# Patient Record
Sex: MALE | Race: WHITE | NOT HISPANIC OR LATINO | Employment: FULL TIME | URBAN - METROPOLITAN AREA
[De-identification: names, ages, dates, MRNs, and addresses within clinical notes are randomized per-mention and may not be internally consistent; named-entity substitution may affect disease eponyms.]

---

## 2017-03-28 ENCOUNTER — OFFICE VISIT (OUTPATIENT)
Dept: FAMILY MEDICINE CLINIC | Facility: CLINIC | Age: 37
End: 2017-03-28

## 2017-03-28 VITALS
TEMPERATURE: 98.2 F | DIASTOLIC BLOOD PRESSURE: 96 MMHG | OXYGEN SATURATION: 99 % | RESPIRATION RATE: 18 BRPM | HEART RATE: 81 BPM | WEIGHT: 244.2 LBS | SYSTOLIC BLOOD PRESSURE: 132 MMHG | BODY MASS INDEX: 31.35 KG/M2

## 2017-03-28 DIAGNOSIS — J18.9 PNEUMONIA OF BOTH LUNGS DUE TO INFECTIOUS ORGANISM, UNSPECIFIED PART OF LUNG: Primary | ICD-10-CM

## 2017-03-28 PROCEDURE — 99214 OFFICE O/P EST MOD 30 MIN: CPT | Performed by: NURSE PRACTITIONER

## 2017-03-28 RX ORDER — AZITHROMYCIN 250 MG/1
TABLET, FILM COATED ORAL
Qty: 6 TABLET | Refills: 0 | Status: SHIPPED | OUTPATIENT
Start: 2017-03-28 | End: 2017-03-31

## 2017-03-28 RX ORDER — ALBUTEROL SULFATE 90 UG/1
2 AEROSOL, METERED RESPIRATORY (INHALATION) EVERY 4 HOURS PRN
Qty: 1 INHALER | Refills: 0 | Status: SHIPPED | OUTPATIENT
Start: 2017-03-28 | End: 2021-01-04

## 2017-03-28 RX ORDER — BENZONATATE 100 MG/1
100-200 CAPSULE ORAL 3 TIMES DAILY PRN
Qty: 120 CAPSULE | Refills: 0 | Status: SHIPPED | OUTPATIENT
Start: 2017-03-28 | End: 2021-01-04

## 2017-03-28 RX ORDER — METHYLPREDNISOLONE 4 MG/1
TABLET ORAL
Qty: 21 TABLET | Refills: 0 | Status: SHIPPED | OUTPATIENT
Start: 2017-03-28 | End: 2019-11-20

## 2017-03-28 NOTE — PROGRESS NOTES
Subjective   Farhan Kaye is a 36 y.o. male.     History of Present Illness   Productive cough with brown sputum, wheezing and tightness in chest, feels rattle in chest at times, fatigue and night sweats and chest congestion for the past 2 weeks  + hx of double pneumonia 3 years ago  Taking OTC meds with no improvement     The following portions of the patient's history were reviewed and updated as appropriate: allergies, current medications, past family history, past medical history, past social history, past surgical history and problem list.    Review of Systems   Constitutional: Positive for fatigue and fever.   HENT: Positive for congestion. Negative for sore throat.    Eyes: Negative.    Respiratory: Positive for cough, chest tightness, shortness of breath and wheezing.    Cardiovascular: Negative.    Gastrointestinal: Negative.    Endocrine: Negative.    Genitourinary: Negative.    Musculoskeletal: Negative.    Skin: Negative.    Allergic/Immunologic: Negative.    Neurological: Negative.  Negative for light-headedness.   Hematological: Negative.    Psychiatric/Behavioral: Negative.        Objective   Physical Exam   Constitutional: He is oriented to person, place, and time. Vital signs are normal. He appears well-developed and well-nourished. No distress.   HENT:   Head: Normocephalic.   Right Ear: Tympanic membrane, external ear and ear canal normal.   Left Ear: Tympanic membrane, external ear and ear canal normal.   Nose: Nose normal. No mucosal edema or rhinorrhea. Right sinus exhibits no maxillary sinus tenderness and no frontal sinus tenderness. Left sinus exhibits no maxillary sinus tenderness and no frontal sinus tenderness.   Mouth/Throat: Uvula is midline, oropharynx is clear and moist and mucous membranes are normal.   Eyes: Conjunctivae and lids are normal. Pupils are equal, round, and reactive to light.   Neck: Trachea normal and normal range of motion. Neck supple. Carotid bruit is not present.  No thyroid mass and no thyromegaly present.   Cardiovascular: Normal rate, regular rhythm, S1 normal, S2 normal, normal heart sounds and intact distal pulses.    Pulmonary/Chest: Effort normal. He has no decreased breath sounds. He has no wheezes. He has rhonchi. He has rales in the right upper field, the right middle field, the right lower field and the left lower field.   Abdominal: Normal appearance.   Musculoskeletal: Normal range of motion.   Lymphadenopathy:        Head (right side): No tonsillar, no preauricular, no posterior auricular and no occipital adenopathy present.        Head (left side): No tonsillar, no preauricular, no posterior auricular and no occipital adenopathy present.     He has no cervical adenopathy.   Neurological: He is alert and oriented to person, place, and time.   Skin: Skin is warm, dry and intact. No rash noted. No cyanosis. Nails show no clubbing.   Psychiatric: He has a normal mood and affect. His speech is normal and behavior is normal. Judgment and thought content normal. Cognition and memory are normal.   Nursing note and vitals reviewed.      Assessment/Plan   Farhan was seen today for uri.    Diagnoses and all orders for this visit:    Pneumonia of both lungs due to infectious organism, unspecified part of lung  -     XR Chest PA & Lateral; Future    Other orders  -     azithromycin (ZITHROMAX) 250 MG tablet; Take 2 tablets the first day, then 1 tablet daily for 4 days.  -     MethylPREDNISolone (MEDROL, KIRSTIE,) 4 MG tablet; Take as directed on package instructions.  -     albuterol (PROVENTIL HFA;VENTOLIN HFA) 108 (90 BASE) MCG/ACT inhaler; Inhale 2 puffs Every 4 (Four) Hours As Needed for Wheezing.  -     benzonatate (TESSALON) 100 MG capsule; Take 1-2 capsules by mouth 3 (Three) Times a Day As Needed for Cough.    will check CXR since pt has had hx of pneumonia 3 years ago and was hospitalized for 7 days  Will start pt on abx and steroids, use inhaler for wheezing and  tightness in chest  Follow up if not improving or go to ER if difficutly breathing; pt agrees.

## 2017-03-29 ENCOUNTER — HOSPITAL ENCOUNTER (OUTPATIENT)
Dept: GENERAL RADIOLOGY | Facility: HOSPITAL | Age: 37
Discharge: HOME | End: 2017-03-29
Admitting: NURSE PRACTITIONER

## 2017-03-29 DIAGNOSIS — J18.9 PNEUMONIA OF BOTH LUNGS DUE TO INFECTIOUS ORGANISM, UNSPECIFIED PART OF LUNG: ICD-10-CM

## 2017-03-29 PROCEDURE — 71020 HC CHEST PA AND LATERAL: CPT

## 2017-03-31 ENCOUNTER — TELEPHONE (OUTPATIENT)
Dept: FAMILY MEDICINE CLINIC | Facility: CLINIC | Age: 37
End: 2017-03-31

## 2017-03-31 RX ORDER — LEVOFLOXACIN 500 MG/1
500 TABLET, FILM COATED ORAL DAILY
Qty: 10 TABLET | Refills: 0 | Status: SHIPPED | OUTPATIENT
Start: 2017-03-31 | End: 2019-11-20

## 2017-03-31 NOTE — TELEPHONE ENCOUNTER
Will send in Levaquin i daily for 10 days. F/U next week if no better. [facility]

## 2017-03-31 NOTE — TELEPHONE ENCOUNTER
----- Message from Monika Valdes sent at 3/31/2017  3:21 PM EDT -----  Contact: ISIDRA  PATIENT WAS IN ON Tuesday AND GIVEN A RX FOR Z-KIRSTIE. HE TAKES THE LAST ONE TOMORROW AND HE IS STILL CONGESTED, GETTING A LITTLE BETTER. ASKING IF YOU CAN SEND HIM IN A RX FOR ANOTHER Z-KIRSTIE OR SOMETHING.    [facility]

## 2017-03-31 NOTE — TELEPHONE ENCOUNTER
Spoke with pt advised med stays in system several days after he finished lst pill. He could try mucinx or stahist. Do you have anything else to add?

## 2017-10-20 ENCOUNTER — FLU SHOT (OUTPATIENT)
Dept: FAMILY MEDICINE CLINIC | Facility: CLINIC | Age: 37
End: 2017-10-20

## 2017-10-20 PROCEDURE — 90686 IIV4 VACC NO PRSV 0.5 ML IM: CPT | Performed by: FAMILY MEDICINE

## 2017-10-20 PROCEDURE — 90471 IMMUNIZATION ADMIN: CPT | Performed by: FAMILY MEDICINE

## 2019-11-20 ENCOUNTER — OFFICE VISIT (OUTPATIENT)
Dept: FAMILY MEDICINE CLINIC | Facility: CLINIC | Age: 39
End: 2019-11-20

## 2019-11-20 VITALS
TEMPERATURE: 97.9 F | HEART RATE: 88 BPM | HEIGHT: 75 IN | DIASTOLIC BLOOD PRESSURE: 80 MMHG | RESPIRATION RATE: 16 BRPM | WEIGHT: 221.6 LBS | BODY MASS INDEX: 27.55 KG/M2 | SYSTOLIC BLOOD PRESSURE: 130 MMHG

## 2019-11-20 DIAGNOSIS — J18.9 WALKING PNEUMONIA: Primary | ICD-10-CM

## 2019-11-20 PROCEDURE — 99213 OFFICE O/P EST LOW 20 MIN: CPT | Performed by: NURSE PRACTITIONER

## 2019-11-20 RX ORDER — SERTRALINE HYDROCHLORIDE 100 MG/1
100 TABLET, FILM COATED ORAL DAILY
Refills: 1 | COMMUNITY
Start: 2019-11-14 | End: 2021-01-04

## 2019-11-20 RX ORDER — LEVOFLOXACIN 500 MG/1
500 TABLET, FILM COATED ORAL DAILY
Qty: 10 TABLET | Refills: 0 | Status: SHIPPED | OUTPATIENT
Start: 2019-11-20 | End: 2021-01-04

## 2019-11-20 RX ORDER — METHYLPREDNISOLONE 4 MG/1
TABLET ORAL
Qty: 21 TABLET | Refills: 0 | Status: SHIPPED | OUTPATIENT
Start: 2019-11-20 | End: 2021-01-04

## 2019-11-20 NOTE — PROGRESS NOTES
Subjective   Farhan Kaye is a 38 y.o. male.     History of Present Illness   Cough and chest congestion that started last Thursday  In bed the last 24 hours  No HA body aches fever or chills, some night sweats and   Wheezing in chest   Using Albuterol inhaler   Tobacco abuse 1 ppd, cutting back for the past week, not ready to quit  Hx of pneumonia about a year    The following portions of the patient's history were reviewed and updated as appropriate: allergies, current medications, past family history, past medical history, past social history, past surgical history and problem list.    Review of Systems   Constitutional: Positive for activity change, diaphoresis, fatigue and fever. Negative for chills.   HENT: Negative.  Negative for congestion, rhinorrhea, sinus pressure, sore throat and tinnitus.    Eyes: Negative.    Respiratory: Positive for cough, chest tightness, shortness of breath and wheezing.    Cardiovascular: Negative.  Negative for chest pain.   Gastrointestinal: Negative.  Negative for nausea and vomiting.   Endocrine: Negative.    Genitourinary: Negative.    Musculoskeletal: Negative.    Skin: Negative.    Neurological: Positive for weakness and light-headedness. Negative for dizziness.   Hematological: Negative.    Psychiatric/Behavioral: Positive for sleep disturbance (night sweats and due to cough).       Objective   Physical Exam   Constitutional: He is oriented to person, place, and time. He appears well-developed and well-nourished. No distress.   HENT:   Head: Normocephalic and atraumatic.   Right Ear: Tympanic membrane, external ear and ear canal normal.   Left Ear: Tympanic membrane, external ear and ear canal normal.   Nose: Nose normal. Right sinus exhibits no maxillary sinus tenderness and no frontal sinus tenderness. Left sinus exhibits no maxillary sinus tenderness and no frontal sinus tenderness.   Mouth/Throat: Oropharynx is clear and moist. No oropharyngeal exudate or posterior  oropharyngeal erythema.   Neck: Trachea normal. Neck supple.   Cardiovascular: Normal rate, regular rhythm, S1 normal, S2 normal, normal heart sounds and normal pulses.   Pulmonary/Chest: Effort normal. No stridor. No respiratory distress. He has wheezes in the right upper field, the right middle field, the right lower field, the left upper field, the left middle field and the left lower field. He has rales in the left middle field and the left lower field.   Musculoskeletal: Normal range of motion. He exhibits no edema.   Lymphadenopathy:        Head (right side): No tonsillar, no preauricular and no posterior auricular adenopathy present.        Head (left side): No tonsillar, no preauricular and no posterior auricular adenopathy present.     He has no cervical adenopathy.   Neurological: He is alert and oriented to person, place, and time.   Skin: Skin is warm and dry. Capillary refill takes less than 2 seconds. He is not diaphoretic. There is pallor.   Psychiatric: He has a normal mood and affect. His speech is normal and behavior is normal. Judgment and thought content normal. Cognition and memory are normal.         Assessment/Plan   Farhan was seen today for cough & chest congestion and sinus congestion.    Diagnoses and all orders for this visit:    Walking pneumonia  -     levoFLOXacin (LEVAQUIN) 500 MG tablet; Take 1 tablet by mouth Daily.  -     methylPREDNISolone (MEDROL, KIRSTIE,) 4 MG tablet; Take as directed on package instructions.  -     mometasone-formoterol (DULERA) 100-5 MCG/ACT inhaler; Inhale 2 puffs 2 (Two) Times a Day.      Take medication as directed  Use inhaler 2 times a day and rinse mouth  Continue Mucinex OTC; rest and fluids.  If pt sx worsening pt advised to go to ER pt agrees  Off work until Sunday

## 2021-01-04 ENCOUNTER — OFFICE VISIT (OUTPATIENT)
Dept: FAMILY MEDICINE CLINIC | Facility: CLINIC | Age: 41
End: 2021-01-04

## 2021-01-04 VITALS
HEIGHT: 74 IN | OXYGEN SATURATION: 98 % | SYSTOLIC BLOOD PRESSURE: 168 MMHG | WEIGHT: 232 LBS | DIASTOLIC BLOOD PRESSURE: 84 MMHG | HEART RATE: 98 BPM | BODY MASS INDEX: 29.77 KG/M2 | TEMPERATURE: 98.7 F | RESPIRATION RATE: 16 BRPM

## 2021-01-04 DIAGNOSIS — Z13.220 SCREENING FOR HYPERLIPIDEMIA: ICD-10-CM

## 2021-01-04 DIAGNOSIS — Z11.59 ENCOUNTER FOR HEPATITIS C SCREENING TEST FOR LOW RISK PATIENT: ICD-10-CM

## 2021-01-04 DIAGNOSIS — R53.83 OTHER FATIGUE: ICD-10-CM

## 2021-01-04 DIAGNOSIS — R03.0 ELEVATED BLOOD PRESSURE READING: ICD-10-CM

## 2021-01-04 DIAGNOSIS — E66.3 OVERWEIGHT (BMI 25.0-29.9): ICD-10-CM

## 2021-01-04 DIAGNOSIS — Z12.5 SCREENING FOR PROSTATE CANCER: ICD-10-CM

## 2021-01-04 DIAGNOSIS — Z23 NEED FOR TDAP VACCINATION: ICD-10-CM

## 2021-01-04 DIAGNOSIS — Z00.00 ANNUAL PHYSICAL EXAM: Primary | ICD-10-CM

## 2021-01-04 PROCEDURE — 90715 TDAP VACCINE 7 YRS/> IM: CPT | Performed by: FAMILY MEDICINE

## 2021-01-04 PROCEDURE — 99396 PREV VISIT EST AGE 40-64: CPT | Performed by: FAMILY MEDICINE

## 2021-01-04 PROCEDURE — 90471 IMMUNIZATION ADMIN: CPT | Performed by: FAMILY MEDICINE

## 2021-01-04 NOTE — PROGRESS NOTES
"Chief Complaint  Annual Exam ((Wife wanted him to have a check up) )    Subjective    History of Present Illness      Farhan Kaye presents to [facility] for   History of Present Illness   Here for annual exam  He has completed a physical through his employer within the last year, but no routine labs.   No acute complaints, some fatigue. He went from first shift to third shift, so fatigue is likely related to work.   Vision exam: up to date  Dental exam: overdue  He is active, exercises routinely. Overall diet is healthy.     Blood pressure is elevated today, however no history of HTN. He does check blood pressure routinely at home, <140/90    Objective   Vital Signs:   /84   Pulse 98   Temp 98.7 °F (37.1 °C)   Resp 16   Ht 188 cm (74\")   Wt 105 kg (232 lb)   SpO2 98%   BMI 29.79 kg/m²     Physical Exam  Vitals signs and nursing note reviewed.   Constitutional:       Appearance: He is well-developed.   HENT:      Head: Normocephalic and atraumatic.      Nose: Nose normal.   Eyes:      Conjunctiva/sclera: Conjunctivae normal.   Neck:      Musculoskeletal: Normal range of motion and neck supple.   Cardiovascular:      Rate and Rhythm: Normal rate and regular rhythm.      Heart sounds: Normal heart sounds.   Pulmonary:      Effort: Pulmonary effort is normal. No respiratory distress.      Breath sounds: Normal breath sounds. No wheezing.   Abdominal:      General: Bowel sounds are normal.      Palpations: Abdomen is soft.   Musculoskeletal:         General: No deformity.   Skin:     General: Skin is warm and dry.   Neurological:      Mental Status: He is alert and oriented to person, place, and time.      Cranial Nerves: No cranial nerve deficit.   Psychiatric:         Behavior: Behavior normal.        Result Review :                 Assessment and Plan    Problem List Items Addressed This Visit     None      Visit Diagnoses     Annual physical exam    -  Primary    " Relevant Orders    Comprehensive Metabolic Panel    CBC & Differential    Lipid Panel With / Chol / HDL Ratio    Elevated blood pressure reading        Monitor at home, return if staying >140/90      Other fatigue        likely related to work shift     Relevant Orders    Comprehensive Metabolic Panel    CBC & Differential    TSH Rfx On Abnormal To Free T4    Vitamin B12    Overweight (BMI 25.0-29.9)        Relevant Orders    Comprehensive Metabolic Panel    CBC & Differential    Lipid Panel With / Chol / HDL Ratio    BMI 29.0-29.9,adult        Relevant Orders    Comprehensive Metabolic Panel    CBC & Differential    Lipid Panel With / Chol / HDL Ratio    Screening for hyperlipidemia        ate approximately 4-5 hours ago, before completing lipid panel    Relevant Orders    Comprehensive Metabolic Panel    CBC & Differential    Lipid Panel With / Chol / HDL Ratio    Screening for prostate cancer        Relevant Orders    PSA Screen    Encounter for hepatitis C screening test for low risk patient        Relevant Orders    Hepatitis C Antibody    Need for Tdap vaccination        Relevant Orders    Tdap Vaccine Greater Than or Equal To 6yo IM (Completed)      Health advice: healthy food choices with fresh fruits and vegetables, maintain sleep pattern at least 8 hours, avoid texting and distracted driving practices; wear safety belt, engage in regular exercise, maintain healthy weight, use safe sex practices, avoid alcohol and illicit drugs. Maintain immunizations that are up to date. Maintain health maintenance:  PSA,  etc.  Follow up with PCP if struggling with depression or anxiety. Keep regular dental and eye exams. Brush and floss teeth daily.   F/U annually and prn.           Follow Up   Return in about 1 year (around 1/4/2022) for Annual.  Patient was given instructions and counseling regarding his condition or for health maintenance advice. Please see specific information pulled into the AVS if appropriate.

## 2021-01-04 NOTE — PATIENT INSTRUCTIONS
Go to the nearest ER or return to clinic if symptoms worsen, fever/chill develop    Preventive Care 40-64 Years Old, Male  Preventive care refers to lifestyle choices and visits with your health care provider that can promote health and wellness. This includes:  · A yearly physical exam. This is also called an annual well check.  · Regular dental and eye exams.  · Immunizations.  · Screening for certain conditions.  · Healthy lifestyle choices, such as eating a healthy diet, getting regular exercise, not using drugs or products that contain nicotine and tobacco, and limiting alcohol use.  What can I expect for my preventive care visit?  Physical exam  Your health care provider will check:  · Height and weight. These may be used to calculate body mass index (BMI), which is a measurement that tells if you are at a healthy weight.  · Heart rate and blood pressure.  · Your skin for abnormal spots.  Counseling  Your health care provider may ask you questions about:  · Alcohol, tobacco, and drug use.  · Emotional well-being.  · Home and relationship well-being.  · Sexual activity.  · Eating habits.  · Work and work environment.  What immunizations do I need?    Influenza (flu) vaccine  · This is recommended every year.  Tetanus, diphtheria, and pertussis (Tdap) vaccine  · You may need a Td booster every 10 years.  Varicella (chickenpox) vaccine  · You may need this vaccine if you have not already been vaccinated.  Zoster (shingles) vaccine  · You may need this after age 60.  Measles, mumps, and rubella (MMR) vaccine  · You may need at least one dose of MMR if you were born in 1957 or later. You may also need a second dose.  Pneumococcal conjugate (PCV13) vaccine  · You may need this if you have certain conditions and were not previously vaccinated.  Pneumococcal polysaccharide (PPSV23) vaccine  · You may need one or two doses if you smoke cigarettes or if you have certain conditions.  Meningococcal conjugate (MenACWY)  vaccine  · You may need this if you have certain conditions.  Hepatitis A vaccine  · You may need this if you have certain conditions or if you travel or work in places where you may be exposed to hepatitis A.  Hepatitis B vaccine  · You may need this if you have certain conditions or if you travel or work in places where you may be exposed to hepatitis B.  Haemophilus influenzae type b (Hib) vaccine  · You may need this if you have certain risk factors.  Human papillomavirus (HPV) vaccine  · If recommended by your health care provider, you may need three doses over 6 months.  You may receive vaccines as individual doses or as more than one vaccine together in one shot (combination vaccines). Talk with your health care provider about the risks and benefits of combination vaccines.  What tests do I need?  Blood tests  · Lipid and cholesterol levels. These may be checked every 5 years, or more frequently if you are over 50 years old.  · Hepatitis C test.  · Hepatitis B test.  Screening  · Lung cancer screening. You may have this screening every year starting at age 55 if you have a 30-pack-year history of smoking and currently smoke or have quit within the past 15 years.  · Prostate cancer screening. Recommendations will vary depending on your family history and other risks.  · Colorectal cancer screening. All adults should have this screening starting at age 50 and continuing until age 75. Your health care provider may recommend screening at age 45 if you are at increased risk. You will have tests every 1-10 years, depending on your results and the type of screening test.  · Diabetes screening. This is done by checking your blood sugar (glucose) after you have not eaten for a while (fasting). You may have this done every 1-3 years.  · Sexually transmitted disease (STD) testing.  Follow these instructions at home:  Eating and drinking  · Eat a diet that includes fresh fruits and vegetables, whole grains, lean protein,  and low-fat dairy products.  · Take vitamin and mineral supplements as recommended by your health care provider.  · Do not drink alcohol if your health care provider tells you not to drink.  · If you drink alcohol:  ? Limit how much you have to 0-2 drinks a day.  ? Be aware of how much alcohol is in your drink. In the [place] one drink equals one 12 oz bottle of beer (355 mL), one 5 oz glass of wine (148 mL), or one 1½ oz glass of hard liquor (44 mL).  Lifestyle  · Take daily care of your teeth and gums.  · Stay active. Exercise for at least 30 minutes on 5 or more days each week.  · Do not use any products that contain nicotine or tobacco, such as cigarettes, e-cigarettes, and chewing tobacco. If you need help quitting, ask your health care provider.  · If you are sexually active, practice safe sex. Use a condom or other form of protection to prevent STIs (sexually transmitted infections).  · Talk with your health care provider about taking a low-dose aspirin every day starting at age 50.  What's next?  · Go to your health care provider once a year for a well check visit.  · Ask your health care provider how often you should have your eyes and teeth checked.  · Stay up to date on all vaccines.  This information is not intended to replace advice given to you by your health care provider. Make sure you discuss any questions you have with your health care provider.  Document Revised: 12/12/2019 Document Reviewed: 12/12/2019  [employer] ([employer]: [place]) Patient Education © 2020 [employer]

## 2021-01-05 LAB
ALBUMIN SERPL-MCNC: 4.3 G/DL (ref 4–5)
ALBUMIN/GLOB SERPL: 1.3 {RATIO} (ref 1.2–2.2)
ALP SERPL-CCNC: 44 IU/L (ref 39–117)
ALT SERPL-CCNC: 61 IU/L (ref 0–44)
AST SERPL-CCNC: 56 IU/L (ref 0–40)
BASOPHILS # BLD AUTO: 0.1 X10E3/UL (ref 0–0.2)
BASOPHILS NFR BLD AUTO: 1 %
BILIRUB SERPL-MCNC: 0.4 MG/DL (ref 0–1.2)
BUN SERPL-MCNC: 24 MG/DL (ref 6–24)
BUN/CREAT SERPL: 28 (ref 9–20)
CALCIUM SERPL-MCNC: 9.6 MG/DL (ref 8.7–10.2)
CHLORIDE SERPL-SCNC: 101 MMOL/L (ref 96–106)
CHOLEST SERPL-MCNC: 112 MG/DL (ref 100–199)
CHOLEST/HDLC SERPL: 8.6 RATIO (ref 0–5)
CO2 SERPL-SCNC: 24 MMOL/L (ref 20–29)
CREAT SERPL-MCNC: 0.86 MG/DL (ref 0.76–1.27)
EOSINOPHIL # BLD AUTO: 0.4 X10E3/UL (ref 0–0.4)
EOSINOPHIL NFR BLD AUTO: 4 %
ERYTHROCYTE [DISTWIDTH] IN BLOOD BY AUTOMATED COUNT: 17.4 % (ref 11.6–15.4)
GLOBULIN SER CALC-MCNC: 3.2 G/DL (ref 1.5–4.5)
GLUCOSE SERPL-MCNC: 65 MG/DL (ref 65–99)
HCT VFR BLD AUTO: 45.6 % (ref 37.5–51)
HCV AB S/CO SERPL IA: <0.1 S/CO RATIO (ref 0–0.9)
HDLC SERPL-MCNC: 13 MG/DL
HGB BLD-MCNC: 14.6 G/DL (ref 13–17.7)
IMM GRANULOCYTES # BLD AUTO: 0 X10E3/UL (ref 0–0.1)
IMM GRANULOCYTES NFR BLD AUTO: 0 %
LDLC SERPL CALC-MCNC: 77 MG/DL (ref 0–99)
LYMPHOCYTES # BLD AUTO: 2.2 X10E3/UL (ref 0.7–3.1)
LYMPHOCYTES NFR BLD AUTO: 22 %
MCH RBC QN AUTO: 23 PG (ref 26.6–33)
MCHC RBC AUTO-ENTMCNC: 32 G/DL (ref 31.5–35.7)
MCV RBC AUTO: 72 FL (ref 79–97)
MONOCYTES # BLD AUTO: 1.1 X10E3/UL (ref 0.1–0.9)
MONOCYTES NFR BLD AUTO: 11 %
NEUTROPHILS # BLD AUTO: 6.1 X10E3/UL (ref 1.4–7)
NEUTROPHILS NFR BLD AUTO: 62 %
PLATELET # BLD AUTO: 408 X10E3/UL (ref 150–450)
POTASSIUM SERPL-SCNC: 4.8 MMOL/L (ref 3.5–5.2)
PROT SERPL-MCNC: 7.5 G/DL (ref 6–8.5)
PSA SERPL-MCNC: 0.9 NG/ML (ref 0–4)
RBC # BLD AUTO: 6.36 X10E6/UL (ref 4.14–5.8)
SODIUM SERPL-SCNC: 137 MMOL/L (ref 134–144)
TRIGL SERPL-MCNC: 116 MG/DL (ref 0–149)
TSH SERPL DL<=0.005 MIU/L-ACNC: 1.91 UIU/ML (ref 0.45–4.5)
VIT B12 SERPL-MCNC: 470 PG/ML (ref 232–1245)
VLDLC SERPL CALC-MCNC: 22 MG/DL (ref 5–40)
WBC # BLD AUTO: 9.8 X10E3/UL (ref 3.4–10.8)

## 2021-01-09 LAB
HAV IGM SERPL QL IA: NEGATIVE
HBV CORE IGM SERPL QL IA: NEGATIVE
HBV SURFACE AG SERPL QL IA: NEGATIVE
HCV AB S/CO SERPL IA: <0.1 S/CO RATIO (ref 0–0.9)
Lab: NORMAL
VERBAL ADD-ON: NORMAL
WRITTEN AUTHORIZATION: NORMAL

## 2022-09-16 ENCOUNTER — OFFICE VISIT (OUTPATIENT)
Dept: FAMILY MEDICINE CLINIC | Facility: CLINIC | Age: 42
End: 2022-09-16

## 2022-09-16 VITALS
RESPIRATION RATE: 16 BRPM | SYSTOLIC BLOOD PRESSURE: 136 MMHG | OXYGEN SATURATION: 99 % | DIASTOLIC BLOOD PRESSURE: 82 MMHG | BODY MASS INDEX: 28.88 KG/M2 | HEART RATE: 96 BPM | WEIGHT: 225 LBS | HEIGHT: 74 IN | TEMPERATURE: 100.4 F

## 2022-09-16 DIAGNOSIS — Z13.220 SCREENING FOR HYPERLIPIDEMIA: ICD-10-CM

## 2022-09-16 DIAGNOSIS — E66.3 OVERWEIGHT (BMI 25.0-29.9): ICD-10-CM

## 2022-09-16 DIAGNOSIS — I10 PRIMARY HYPERTENSION: ICD-10-CM

## 2022-09-16 DIAGNOSIS — M51.36 DDD (DEGENERATIVE DISC DISEASE), LUMBAR: ICD-10-CM

## 2022-09-16 DIAGNOSIS — Z00.00 ANNUAL PHYSICAL EXAM: Primary | ICD-10-CM

## 2022-09-16 DIAGNOSIS — H61.21 IMPACTED CERUMEN OF RIGHT EAR: ICD-10-CM

## 2022-09-16 DIAGNOSIS — G89.29 CHRONIC BILATERAL LOW BACK PAIN WITH LEFT-SIDED SCIATICA: ICD-10-CM

## 2022-09-16 DIAGNOSIS — M54.42 CHRONIC BILATERAL LOW BACK PAIN WITH LEFT-SIDED SCIATICA: ICD-10-CM

## 2022-09-16 PROBLEM — M51.369 DDD (DEGENERATIVE DISC DISEASE), LUMBAR: Status: ACTIVE | Noted: 2022-09-16

## 2022-09-16 PROCEDURE — 69209 REMOVE IMPACTED EAR WAX UNI: CPT | Performed by: FAMILY MEDICINE

## 2022-09-16 PROCEDURE — 99396 PREV VISIT EST AGE 40-64: CPT | Performed by: FAMILY MEDICINE

## 2022-09-16 RX ORDER — TRAZODONE HYDROCHLORIDE 50 MG/1
50 TABLET ORAL NIGHTLY
COMMUNITY
Start: 2022-09-10

## 2022-09-16 RX ORDER — LOSARTAN POTASSIUM 25 MG/1
25 TABLET ORAL DAILY
Qty: 90 TABLET | Refills: 3 | Status: SHIPPED | OUTPATIENT
Start: 2022-09-16

## 2022-09-16 RX ORDER — LOSARTAN POTASSIUM 25 MG/1
25 TABLET ORAL DAILY
COMMUNITY
Start: 2022-09-12 | End: 2022-09-16 | Stop reason: SDUPTHER

## 2022-09-16 RX ORDER — ASPIRIN 325 MG
325 TABLET, DELAYED RELEASE (ENTERIC COATED) ORAL DAILY
COMMUNITY

## 2022-09-16 NOTE — PROGRESS NOTES
"Chief Complaint  Annual Exam (Pt is fasting ) and Back Pain (X on going )    Subjective          Farhan Kaye presents to [facility]  History of Present Illness  Here for annual exam   Dental exam: not up date  Sleep is good, taking Trazodone.     He strength trains for exercise   Unable to lay flat on his back due to increasing back pain. Symptoms are improving, but he has to sleep sitting up.  Denies numbness, tingling, burning in lower extremities.   He avoids flat lifting   Completed lumbar xray in 2016, DDD, severe arthritic changes. States that physical therapy hasn't helped in the past.  Previously saw Dr. Vernon, neurosurgeon, but not a surgical candidate at that time.    The following portions of the patient's history were reviewed and updated as appropriate: allergies, current medications, past family history, past medical history, past social history, past surgical history and problem list.    Objective   Vital Signs:   /82   Pulse 96   Temp 100.4 °F (38 °C)   Resp 16   Ht 188 cm (74\")   Wt 102 kg (225 lb)   SpO2 99%   BMI 28.89 kg/m²     Physical Exam  Vitals and nursing note reviewed.   Constitutional:       General: He is not in acute distress.     Appearance: He is well-developed.   HENT:      Head: Normocephalic and atraumatic.      Right Ear: External ear normal. There is impacted cerumen.      Left Ear: Tympanic membrane, ear canal and external ear normal.   Eyes:      Conjunctiva/sclera: Conjunctivae normal.   Cardiovascular:      Rate and Rhythm: Normal rate and regular rhythm.      Heart sounds: Normal heart sounds.   Pulmonary:      Effort: Pulmonary effort is normal.      Breath sounds: Normal breath sounds. No wheezing.   Musculoskeletal:         General: No deformity.      Lumbar back: Tenderness (midline) present.   Neurological:      General: No focal deficit present.      Mental Status: He is alert and oriented to person, place, and time. "   Psychiatric:         Mood and Affect: Mood normal.         Behavior: Behavior normal.        Result Review :          Cerumen Removal    Date/Time: 9/16/2022 8:40 AM  Performed by: Sarah Rodriguez MA  Authorized by: Gissel Hassan DO   Consent: Verbal consent obtained.  Risks and benefits: risks, benefits and alternatives were discussed  Consent given by: patient  Patient understanding: patient states understanding of the procedure being performed  Patient consent: the patient's understanding of the procedure matches consent given  Procedure consent: procedure consent matches procedure scheduled  Patient identity confirmed: verbally with patient    Anesthesia:  Local Anesthetic: none  Location details: right ear  Patient tolerance: patient tolerated the procedure well with no immediate complications  Procedure type: irrigation   Sedation:  Patient sedated: no              Assessment and Plan    Diagnoses and all orders for this visit:    1. Annual physical exam (Primary)  -     CBC & Differential  -     Comprehensive Metabolic Panel  -     Lipid Panel With / Chol / HDL Ratio  -     TSH Rfx On Abnormal To Free T4    2. Primary hypertension  -     losartan (COZAAR) 25 MG tablet; Take 1 tablet by mouth Daily.  Dispense: 90 tablet; Refill: 3    3. Chronic bilateral low back pain with left-sided sciatica  Comments:  X-ray ordered.  Consider MRI lumbar spine and consult with pain management  Orders:  -     XR Spine Lumbar 4+ View; Future    4. DDD (degenerative disc disease), lumbar    5. Overweight (BMI 25.0-29.9)  -     CBC & Differential  -     Comprehensive Metabolic Panel  -     Lipid Panel With / Chol / HDL Ratio  -     TSH Rfx On Abnormal To Free T4    6. Impacted cerumen of right ear  -     Cerumen Removal    7. BMI 28.0-28.9,adult  -     CBC & Differential  -     Comprehensive Metabolic Panel  -     Lipid Panel With / Chol / HDL Ratio  -     TSH Rfx On Abnormal To Free T4    8. Screening for  hyperlipidemia  -     Lipid Panel With / Chol / HDL Ratio    Health advice: healthy food choices with fresh fruits and vegetables, maintain sleep pattern at least 8 hours, avoid texting and distracted driving practices; wear safety belt, engage in regular exercise, maintain healthy weight, use safe sex practices, avoid alcohol and illicit drugs. Maintain immunizations that are up to date. Maintain health maintenance.  Follow up with PCP if struggling with depression or anxiety. Keep regular dental and eye exams. Brush and floss teeth daily.   F/U annually and prn.         Follow Up   No follow-ups on file.  Patient was given instructions and counseling regarding his condition or for health maintenance advice. Please see specific information pulled into the AVS if appropriate.

## 2022-09-17 LAB
ALBUMIN SERPL-MCNC: 4.3 G/DL (ref 3.5–5.2)
ALBUMIN/GLOB SERPL: 1.8 G/DL
ALP SERPL-CCNC: 40 U/L (ref 39–117)
ALT SERPL-CCNC: 50 U/L (ref 1–41)
AST SERPL-CCNC: 38 U/L (ref 1–40)
BASOPHILS # BLD AUTO: ABNORMAL 10*3/UL
BILIRUB SERPL-MCNC: 0.5 MG/DL (ref 0–1.2)
BUN SERPL-MCNC: 18 MG/DL (ref 6–20)
BUN/CREAT SERPL: 16.2 (ref 7–25)
CALCIUM SERPL-MCNC: 9 MG/DL (ref 8.6–10.5)
CHLORIDE SERPL-SCNC: 102 MMOL/L (ref 98–107)
CHOLEST SERPL-MCNC: 121 MG/DL (ref 0–200)
CHOLEST/HDLC SERPL: 4.84 {RATIO}
CO2 SERPL-SCNC: 28.5 MMOL/L (ref 22–29)
CREAT SERPL-MCNC: 1.11 MG/DL (ref 0.76–1.27)
DIFFERENTIAL COMMENT: NORMAL
EGFRCR SERPLBLD CKD-EPI 2021: 84.5 ML/MIN/1.73
EOSINOPHIL # BLD AUTO: ABNORMAL 10*3/UL
EOSINOPHIL # BLD MANUAL: 0.19 10*3/MM3 (ref 0–0.4)
EOSINOPHIL NFR BLD AUTO: ABNORMAL %
EOSINOPHIL NFR BLD MANUAL: 3.1 % (ref 0.3–6.2)
ERYTHROCYTE [DISTWIDTH] IN BLOOD BY AUTOMATED COUNT: 14.8 % (ref 12.3–15.4)
GLOBULIN SER CALC-MCNC: 2.4 GM/DL
GLUCOSE SERPL-MCNC: 76 MG/DL (ref 65–99)
HCT VFR BLD AUTO: 45.4 % (ref 37.5–51)
HDLC SERPL-MCNC: 25 MG/DL (ref 40–60)
HGB BLD-MCNC: 14 G/DL (ref 13–17.7)
LDLC SERPL CALC-MCNC: 84 MG/DL (ref 0–100)
LYMPHOCYTES # BLD AUTO: ABNORMAL 10*3/UL
LYMPHOCYTES # BLD MANUAL: 1.73 10*3/MM3 (ref 0.7–3.1)
LYMPHOCYTES NFR BLD AUTO: ABNORMAL %
LYMPHOCYTES NFR BLD MANUAL: 27.6 % (ref 19.6–45.3)
MCH RBC QN AUTO: 22.1 PG (ref 26.6–33)
MCHC RBC AUTO-ENTMCNC: 30.8 G/DL (ref 31.5–35.7)
MCV RBC AUTO: 71.6 FL (ref 79–97)
MONOCYTES # BLD MANUAL: 0.45 10*3/MM3 (ref 0.1–0.9)
MONOCYTES NFR BLD AUTO: ABNORMAL %
MONOCYTES NFR BLD MANUAL: 7.1 % (ref 5–12)
NEUTROPHILS # BLD MANUAL: 3.9 10*3/MM3 (ref 1.7–7)
NEUTROPHILS NFR BLD AUTO: ABNORMAL %
NEUTROPHILS NFR BLD MANUAL: 62.2 % (ref 42.7–76)
PLATELET # BLD AUTO: 378 10*3/MM3 (ref 140–450)
PLATELET BLD QL SMEAR: NORMAL
POTASSIUM SERPL-SCNC: 4.8 MMOL/L (ref 3.5–5.2)
PROT SERPL-MCNC: 6.7 G/DL (ref 6–8.5)
RBC # BLD AUTO: 6.34 10*6/MM3 (ref 4.14–5.8)
RBC MORPH BLD: NORMAL
SODIUM SERPL-SCNC: 142 MMOL/L (ref 136–145)
TRIGL SERPL-MCNC: 51 MG/DL (ref 0–150)
TSH SERPL DL<=0.005 MIU/L-ACNC: 1.32 UIU/ML (ref 0.27–4.2)
VLDLC SERPL CALC-MCNC: 12 MG/DL (ref 5–40)
WBC # BLD AUTO: 6.27 10*3/MM3 (ref 3.4–10.8)

## 2022-09-22 LAB
IRON SATN MFR SERPL: 30 % (ref 20–50)
IRON SERPL-MCNC: 164 MCG/DL (ref 59–158)
Lab: NORMAL
TIBC SERPL-MCNC: 547 MCG/DL
UIBC SERPL-MCNC: 383 MCG/DL (ref 112–346)
WRITTEN AUTHORIZATION: NORMAL

## 2022-09-25 DIAGNOSIS — E83.19 IRON EXCESS: Primary | ICD-10-CM

## 2022-10-07 ENCOUNTER — HOSPITAL ENCOUNTER (OUTPATIENT)
Dept: GENERAL RADIOLOGY | Facility: HOSPITAL | Age: 42
Discharge: HOME | End: 2022-10-07
Admitting: FAMILY MEDICINE

## 2022-10-07 DIAGNOSIS — G89.29 CHRONIC BILATERAL LOW BACK PAIN WITH LEFT-SIDED SCIATICA: ICD-10-CM

## 2022-10-07 DIAGNOSIS — M54.42 CHRONIC BILATERAL LOW BACK PAIN WITH LEFT-SIDED SCIATICA: ICD-10-CM

## 2022-10-07 PROCEDURE — 72110 X-RAY EXAM L-2 SPINE 4/>VWS: CPT

## 2022-10-09 DIAGNOSIS — M54.42 CHRONIC BILATERAL LOW BACK PAIN WITH LEFT-SIDED SCIATICA: Primary | ICD-10-CM

## 2022-10-09 DIAGNOSIS — M51.36 DDD (DEGENERATIVE DISC DISEASE), LUMBAR: ICD-10-CM

## 2022-10-09 DIAGNOSIS — G89.29 CHRONIC BILATERAL LOW BACK PAIN WITH LEFT-SIDED SCIATICA: Primary | ICD-10-CM

## 2022-10-17 ENCOUNTER — TELEPHONE (OUTPATIENT)
Dept: FAMILY MEDICINE CLINIC | Facility: CLINIC | Age: 42
End: 2022-10-17

## 2022-10-17 DIAGNOSIS — M54.42 CHRONIC BILATERAL LOW BACK PAIN WITH LEFT-SIDED SCIATICA: Primary | ICD-10-CM

## 2022-10-17 DIAGNOSIS — G89.29 CHRONIC BILATERAL LOW BACK PAIN WITH LEFT-SIDED SCIATICA: Primary | ICD-10-CM

## 2022-10-17 RX ORDER — GABAPENTIN 100 MG/1
100 CAPSULE ORAL 3 TIMES DAILY
Qty: 90 CAPSULE | Refills: 2 | Status: SHIPPED | OUTPATIENT
Start: 2022-10-17 | End: 2022-10-21 | Stop reason: SDUPTHER

## 2022-10-17 NOTE — TELEPHONE ENCOUNTER
Caller: Farhan Kaye    Relationship: Self    Best call back number:[phone]    What is the best time to reach you: ANY TIME    Who are you requesting to speak with (clinical staff, provider,  specific staff member): DR SIMENTAL    Do you know the name of the person who called: SELF    What was the call regarding: PATIENT CALLED TO SAY THAT OTHER PROVIDER IS FULLY AWARE OF MEDICATION REQUEST OF GABAPENTIN. ANY QUESTIONS CAN CALL PATIENT BACK OR CALL OTHER PROVIDER IF NEEDED.    Do you require a callback: YES

## 2022-10-21 DIAGNOSIS — M54.42 CHRONIC BILATERAL LOW BACK PAIN WITH LEFT-SIDED SCIATICA: ICD-10-CM

## 2022-10-21 DIAGNOSIS — G89.29 CHRONIC BILATERAL LOW BACK PAIN WITH LEFT-SIDED SCIATICA: ICD-10-CM

## 2022-10-21 RX ORDER — GABAPENTIN 300 MG/1
300 CAPSULE ORAL 3 TIMES DAILY
Qty: 90 CAPSULE | Refills: 0 | Status: SHIPPED | OUTPATIENT
Start: 2022-10-21 | End: 2022-11-16 | Stop reason: SDUPTHER

## 2022-10-21 RX ORDER — IBUPROFEN 800 MG/1
800 TABLET ORAL EVERY 8 HOURS PRN
Qty: 90 TABLET | Refills: 1 | Status: SHIPPED | OUTPATIENT
Start: 2022-10-21 | End: 2023-02-08

## 2022-11-16 DIAGNOSIS — M54.42 CHRONIC BILATERAL LOW BACK PAIN WITH LEFT-SIDED SCIATICA: ICD-10-CM

## 2022-11-16 DIAGNOSIS — G89.29 CHRONIC BILATERAL LOW BACK PAIN WITH LEFT-SIDED SCIATICA: ICD-10-CM

## 2022-11-17 ENCOUNTER — HOSPITAL ENCOUNTER (OUTPATIENT)
Dept: MRI IMAGING | Facility: HOSPITAL | Age: 42
Discharge: HOME | End: 2022-11-17
Admitting: FAMILY MEDICINE

## 2022-11-17 DIAGNOSIS — G89.29 CHRONIC BILATERAL LOW BACK PAIN WITH LEFT-SIDED SCIATICA: ICD-10-CM

## 2022-11-17 DIAGNOSIS — M54.42 CHRONIC BILATERAL LOW BACK PAIN WITH LEFT-SIDED SCIATICA: ICD-10-CM

## 2022-11-17 DIAGNOSIS — M48.061 SPINAL STENOSIS OF LUMBAR REGION, UNSPECIFIED WHETHER NEUROGENIC CLAUDICATION PRESENT: Primary | ICD-10-CM

## 2022-11-17 DIAGNOSIS — M51.36 DDD (DEGENERATIVE DISC DISEASE), LUMBAR: ICD-10-CM

## 2022-11-17 DIAGNOSIS — M48.061 NEURAL FORAMINAL STENOSIS OF LUMBAR SPINE: ICD-10-CM

## 2022-11-17 PROCEDURE — 72148 MRI LUMBAR SPINE W/O DYE: CPT

## 2022-11-17 RX ORDER — GABAPENTIN 300 MG/1
300 CAPSULE ORAL 3 TIMES DAILY
Qty: 90 CAPSULE | Refills: 0 | Status: SHIPPED | OUTPATIENT
Start: 2022-11-17 | End: 2022-12-07 | Stop reason: DRUGHIGH

## 2022-12-07 ENCOUNTER — OFFICE VISIT (OUTPATIENT)
Dept: FAMILY MEDICINE CLINIC | Facility: CLINIC | Age: 42
End: 2022-12-07

## 2022-12-07 ENCOUNTER — TELEPHONE (OUTPATIENT)
Dept: FAMILY MEDICINE CLINIC | Facility: CLINIC | Age: 42
End: 2022-12-07

## 2022-12-07 VITALS
SYSTOLIC BLOOD PRESSURE: 140 MMHG | TEMPERATURE: 98.6 F | HEART RATE: 74 BPM | HEIGHT: 74 IN | BODY MASS INDEX: 27.98 KG/M2 | DIASTOLIC BLOOD PRESSURE: 90 MMHG | WEIGHT: 218 LBS | RESPIRATION RATE: 14 BRPM

## 2022-12-07 DIAGNOSIS — G89.29 CHRONIC BILATERAL LOW BACK PAIN WITH LEFT-SIDED SCIATICA: Primary | ICD-10-CM

## 2022-12-07 DIAGNOSIS — M48.061 NEURAL FORAMINAL STENOSIS OF LUMBAR SPINE: ICD-10-CM

## 2022-12-07 DIAGNOSIS — L05.91 PILONIDAL CYST: ICD-10-CM

## 2022-12-07 DIAGNOSIS — M54.42 CHRONIC BILATERAL LOW BACK PAIN WITH LEFT-SIDED SCIATICA: Primary | ICD-10-CM

## 2022-12-07 DIAGNOSIS — M48.061 SPINAL STENOSIS OF LUMBAR REGION, UNSPECIFIED WHETHER NEUROGENIC CLAUDICATION PRESENT: ICD-10-CM

## 2022-12-07 DIAGNOSIS — Z23 NEEDS FLU SHOT: ICD-10-CM

## 2022-12-07 PROCEDURE — 90471 IMMUNIZATION ADMIN: CPT | Performed by: FAMILY MEDICINE

## 2022-12-07 PROCEDURE — 90686 IIV4 VACC NO PRSV 0.5 ML IM: CPT | Performed by: FAMILY MEDICINE

## 2022-12-07 PROCEDURE — 99214 OFFICE O/P EST MOD 30 MIN: CPT | Performed by: FAMILY MEDICINE

## 2022-12-07 RX ORDER — METRONIDAZOLE 500 MG/1
500 TABLET ORAL 2 TIMES DAILY
Qty: 14 TABLET | Refills: 0 | Status: SHIPPED | OUTPATIENT
Start: 2022-12-07

## 2022-12-07 RX ORDER — GABAPENTIN 600 MG/1
600 TABLET ORAL 3 TIMES DAILY
Qty: 90 TABLET | Refills: 2 | Status: SHIPPED | OUTPATIENT
Start: 2022-12-07 | End: 2023-02-27 | Stop reason: SDUPTHER

## 2022-12-07 RX ORDER — FLUTICASONE PROPIONATE 50 MCG
2 SPRAY, SUSPENSION (ML) NASAL AS NEEDED
COMMUNITY
Start: 2022-11-07 | End: 2023-11-07

## 2022-12-07 NOTE — PROGRESS NOTES
"Chief Complaint  FMLA PPW (Patient is needing [facility] paperwork filled out in regards to chronic back pain.Scheduled with [facility] on 12/15/22 for consultation. ) and Cyst (Cyst on tailbone is flaring up for the last six weeks off and on. Patient is draining himself at home, cleaning with soap and water, and alcohol. Seeking referral for removal. )    Subjective          Farhan Kaye presents to [facility]  Back Pain  This is a chronic problem. The current episode started more than 1 year ago. The problem occurs constantly. The problem has been gradually worsening since onset. The pain is present in the lumbar spine and sacro-iliac. The quality of the pain is described as aching and stabbing. The pain radiates to the left thigh. The pain is at a severity of 6/10. The pain is the same all the time. The symptoms are aggravated by bending, position, sitting, standing and twisting. Stiffness is present all day. Associated symptoms include leg pain. Risk factors include history of cancer.     Farhan Kaye is a 42-year-old male who presents today for follow-up of chronic back pain and he is requesting FMLA. He is also scheduled with [facility] orthopedics on 12/15/2022 for consultation. He also pilonidal cyst states that it is flaring up over the last 6 weeks. He has been draining it himself, cleaning with soap and water and alcohol. He is requesting a referral to have this removed. He states his MRI revealed spinal stenosis as well as neuroforaminal stenosis.     The patient states his back pain is \"up and down.\" He states that the pain is always there but is not always unbearable. He states he gabapentin is working well, but feels as if an increase in his dose would help. He states that gabapentin has helped more than his pain medication. He states it will take 2 or 3 days for his back pain to calm down after he has a flare up of pain.     He states his pilonidal cyst has been flaring up " recently and is inquiring if an antibiotic will improve his symptoms until he can get in with a surgeon. He has been draining it at home, and keeping it washed with soap and water.       The following portions of the patient's history were reviewed and updated as appropriate: allergies, current medications, past family history, past medical history, past social history, past surgical history and problem list.    Objective      Physical Exam  Vitals reviewed.   Constitutional:       Appearance: He is well-developed.   HENT:      Head: Normocephalic and atraumatic.   Pulmonary:      Effort: Pulmonary effort is normal. No respiratory distress.   Neurological:      Mental Status: He is alert and oriented to person, place, and time.        Result Review :                Assessment and Plan    Diagnoses and all orders for this visit:    1. Chronic bilateral low back pain with left-sided sciatica (Primary)  -     gabapentin (NEURONTIN) 600 MG tablet; Take 1 tablet by mouth 3 (Three) Times a Day.  Dispense: 90 tablet; Refill: 2    2. Neural foraminal stenosis of lumbar spine  -     gabapentin (NEURONTIN) 600 MG tablet; Take 1 tablet by mouth 3 (Three) Times a Day.  Dispense: 90 tablet; Refill: 2    3. Spinal stenosis of lumbar region, unspecified whether neurogenic claudication present  -     gabapentin (NEURONTIN) 600 MG tablet; Take 1 tablet by mouth 3 (Three) Times a Day.  Dispense: 90 tablet; Refill: 2    4. Pilonidal cyst  -     Ambulatory Referral to General Surgery  -     metroNIDAZOLE (Flagyl) 500 MG tablet; Take 1 tablet by mouth 2 (Two) Times a Day.  Dispense: 14 tablet; Refill: 0    5. Needs flu shot  -     FluLaval/Fluzone >6 mos ([id number])    Answers for HPI/ROS submitted by the patient on 12/6/2022  What is the primary reason for your visit?: Back Pain    1. Chronic back pain   - The patient is still experiencing lowered back pain. He has an appointment with orthopedics on 12/15/2022 for consultation. His  gabapentin dose was increased to 600 MG to help relieve the nerve pain he is experiencing.     2. Pilonidal cyst   - The patient has a history of pilonidal cyst that has been recurrent. He was prescribed an antibiotic and was advised to continue the at home care he is currently doing. Referral to general surgeon was ordered.         Follow Up   No follow-ups on file.  Patient was given instructions and counseling regarding his condition or for health maintenance advice. Please see specific information pulled into the AVS if appropriate.     Transcribed from ambient dictation for Gissel Hassan DO by Nancy Moreno, Quality .  12/07/22   15:44 EST    Patient or patient representative verbalized consent to the visit recording.  I have personally performed the services described in this document as transcribed by the above individual, and it is both accurate and complete.  Gissel Hassan DO  12/8/2022  08:39 EST

## 2022-12-08 ENCOUNTER — PATIENT MESSAGE (OUTPATIENT)
Dept: FAMILY MEDICINE CLINIC | Facility: CLINIC | Age: 42
End: 2022-12-08

## 2023-01-03 DIAGNOSIS — I10 PRIMARY HYPERTENSION: ICD-10-CM

## 2023-01-03 RX ORDER — LOSARTAN POTASSIUM 25 MG/1
25 TABLET ORAL DAILY
Qty: 90 TABLET | Refills: 3 | OUTPATIENT
Start: 2023-01-03

## 2023-01-18 ENCOUNTER — TELEPHONE (OUTPATIENT)
Dept: FAMILY MEDICINE CLINIC | Facility: CLINIC | Age: 43
End: 2023-01-18
Payer: COMMERCIAL

## 2023-01-18 NOTE — TELEPHONE ENCOUNTER
Sorry to hear that recovery has been painful.   Since pain is related to recent surgery, recommend that he follow their recommendations.

## 2023-01-18 NOTE — TELEPHONE ENCOUNTER
Regarding: Pilondial cyst removal   Contact: [phone]  ----- Message from Dilma Moses MA sent at 1/18/2023 10:29 AM EST -----       ----- Message from Farhan Kaye to Gissel Hassan DO sent at 1/17/2023  5:44 AM -----   I can’t sleep on my side because of my back. Where I have to lay on it keeps the pain up to a 7 or so all night. I slept 2 hrs last night. I started to go to the er. That’s what corrina’s nurse advised me to do if I can’t stand the pain. It’s so expensive and you don’t even know if they will actually do anything to help. I feel let down and abandoned really. I got my wound vac in finally yesterday which actually made things a little worse but gives me a little hope in the long run.      ----- Message -----       From:Farhan Kaye       Sent:1/16/2023  1:39 PM EST         To:Patient Medical Advice Request Message List    Subject:Pilondial cyst removal     The clinic knew when I had surgery there was a possibility of me coming of the suboxone for a while until the pain is in check and that afterwards I would go back to my normal routine. They didn’t really have much to say about it.       ----- Message -----       From:Farhan Kaye       Sent:1/16/2023 12:40 PM EST         To:Patient Medical Advice Request Message List    Subject:Pilondial cyst removal     Here’s a picture.      ----- Message -----       From:Farhan Kaye       Sent:1/16/2023  9:42 AM EST         To:Gissel Hassan    Subject:Pilondial cyst removal     When Dr Mahad Negrete performed surgery he found a second cyst and said one was tunneling. He cut a huge hole in my butt. I am miserable in pain. On the paperwork they gave me it said to call them if your pain wasn’t being managed. He originally wrote 20 norco 7.5 every 6 hrs. When my wife called He said I could take one every 4 hrs and couldn’t do anything beyond that on account of state restrictions. The surgery was Thursday afternoon and now I’m  completely out of medication. I can’t sleep, I can’t hardly sit much less lay down because it puts massive pressure on the wound. I’m miserable in pain and apparently he can’t help or won’t. I don’t know if there is anything you can do to help or not or if you would even consider. If you can please and if not I understand. If I knew how I would send you a picture of the hole. He seems to think it will close up over time but I’m scared I have doubts. Thanks for your time in reading this.

## 2023-02-08 DIAGNOSIS — G89.29 CHRONIC BILATERAL LOW BACK PAIN WITH LEFT-SIDED SCIATICA: ICD-10-CM

## 2023-02-08 DIAGNOSIS — M54.42 CHRONIC BILATERAL LOW BACK PAIN WITH LEFT-SIDED SCIATICA: ICD-10-CM

## 2023-02-08 RX ORDER — IBUPROFEN 800 MG/1
TABLET ORAL
Qty: 90 TABLET | Refills: 1 | Status: SHIPPED | OUTPATIENT
Start: 2023-02-08

## 2023-02-27 DIAGNOSIS — M48.061 SPINAL STENOSIS OF LUMBAR REGION, UNSPECIFIED WHETHER NEUROGENIC CLAUDICATION PRESENT: ICD-10-CM

## 2023-02-27 DIAGNOSIS — M54.42 CHRONIC BILATERAL LOW BACK PAIN WITH LEFT-SIDED SCIATICA: ICD-10-CM

## 2023-02-27 DIAGNOSIS — G89.29 CHRONIC BILATERAL LOW BACK PAIN WITH LEFT-SIDED SCIATICA: ICD-10-CM

## 2023-02-27 DIAGNOSIS — M48.061 NEURAL FORAMINAL STENOSIS OF LUMBAR SPINE: ICD-10-CM

## 2023-02-27 RX ORDER — GABAPENTIN 600 MG/1
600 TABLET ORAL 3 TIMES DAILY
Qty: 90 TABLET | Refills: 2 | Status: SHIPPED | OUTPATIENT
Start: 2023-02-27

## 2023-03-24 RX ORDER — TRAZODONE HYDROCHLORIDE 50 MG/1
50 TABLET ORAL NIGHTLY
OUTPATIENT
Start: 2023-03-24

## 2023-05-19 DIAGNOSIS — M48.061 SPINAL STENOSIS OF LUMBAR REGION, UNSPECIFIED WHETHER NEUROGENIC CLAUDICATION PRESENT: ICD-10-CM

## 2023-05-19 DIAGNOSIS — M48.061 NEURAL FORAMINAL STENOSIS OF LUMBAR SPINE: ICD-10-CM

## 2023-05-19 DIAGNOSIS — G89.29 CHRONIC BILATERAL LOW BACK PAIN WITH LEFT-SIDED SCIATICA: ICD-10-CM

## 2023-05-19 DIAGNOSIS — M54.42 CHRONIC BILATERAL LOW BACK PAIN WITH LEFT-SIDED SCIATICA: ICD-10-CM

## 2023-05-19 RX ORDER — GABAPENTIN 600 MG/1
600 TABLET ORAL 3 TIMES DAILY
Qty: 90 TABLET | Refills: 2 | Status: SHIPPED | OUTPATIENT
Start: 2023-05-19

## 2023-08-14 DIAGNOSIS — M48.061 NEURAL FORAMINAL STENOSIS OF LUMBAR SPINE: ICD-10-CM

## 2023-08-14 DIAGNOSIS — M48.061 SPINAL STENOSIS OF LUMBAR REGION, UNSPECIFIED WHETHER NEUROGENIC CLAUDICATION PRESENT: ICD-10-CM

## 2023-08-14 DIAGNOSIS — M54.42 CHRONIC BILATERAL LOW BACK PAIN WITH LEFT-SIDED SCIATICA: ICD-10-CM

## 2023-08-14 DIAGNOSIS — G89.29 CHRONIC BILATERAL LOW BACK PAIN WITH LEFT-SIDED SCIATICA: ICD-10-CM

## 2023-08-15 RX ORDER — GABAPENTIN 600 MG/1
600 TABLET ORAL 3 TIMES DAILY
Qty: 90 TABLET | Refills: 0 | Status: SHIPPED | OUTPATIENT
Start: 2023-08-15

## 2023-08-15 NOTE — TELEPHONE ENCOUNTER
PATIENT CALLED AND SCHEDULED THE NEXT AVAILABLE APPOINTMENT 09/20/23. PATIENT NEEDS AT LEAST 1 REFILL UNTIL HE IS ABLE TO SEE KAMILLE VAZQUEZ AS HE HAS 1 DAY REMAINING. PLEASE ADVISE [phone]     gabapentin (NEURONTIN) 600 MG tablet    [id number] [place], [place] - [address] [place] [address] AT SEC OF [facility] - [phone] [facility] [phone] FX

## 2023-09-20 ENCOUNTER — OFFICE VISIT (OUTPATIENT)
Dept: FAMILY MEDICINE CLINIC | Facility: CLINIC | Age: 43
End: 2023-09-20
Payer: COMMERCIAL

## 2023-09-20 VITALS
OXYGEN SATURATION: 97 % | WEIGHT: 209.4 LBS | SYSTOLIC BLOOD PRESSURE: 142 MMHG | HEART RATE: 98 BPM | BODY MASS INDEX: 26.87 KG/M2 | HEIGHT: 74 IN | DIASTOLIC BLOOD PRESSURE: 74 MMHG | RESPIRATION RATE: 16 BRPM | TEMPERATURE: 99.1 F

## 2023-09-20 DIAGNOSIS — Z13.220 SCREENING FOR HYPERLIPIDEMIA: ICD-10-CM

## 2023-09-20 DIAGNOSIS — G89.29 CHRONIC BILATERAL LOW BACK PAIN WITH LEFT-SIDED SCIATICA: ICD-10-CM

## 2023-09-20 DIAGNOSIS — M48.061 NEURAL FORAMINAL STENOSIS OF LUMBAR SPINE: ICD-10-CM

## 2023-09-20 DIAGNOSIS — I10 PRIMARY HYPERTENSION: Primary | ICD-10-CM

## 2023-09-20 DIAGNOSIS — M48.061 SPINAL STENOSIS OF LUMBAR REGION, UNSPECIFIED WHETHER NEUROGENIC CLAUDICATION PRESENT: ICD-10-CM

## 2023-09-20 DIAGNOSIS — M54.42 CHRONIC BILATERAL LOW BACK PAIN WITH LEFT-SIDED SCIATICA: ICD-10-CM

## 2023-09-20 PROCEDURE — 99214 OFFICE O/P EST MOD 30 MIN: CPT | Performed by: FAMILY MEDICINE

## 2023-09-20 RX ORDER — GABAPENTIN 600 MG/1
600 TABLET ORAL 3 TIMES DAILY
Qty: 90 TABLET | Refills: 5 | Status: SHIPPED | OUTPATIENT
Start: 2023-09-20

## 2023-09-20 RX ORDER — LOSARTAN POTASSIUM 25 MG/1
25 TABLET ORAL DAILY
Qty: 90 TABLET | Refills: 0 | Status: SHIPPED | OUTPATIENT
Start: 2023-09-20

## 2023-09-20 NOTE — PROGRESS NOTES
Chief Complaint  Med Refill (Pt is not fasting.)    Subjective          Farhan Kaye presents to [facility]  History of Present Illness  He reports that his blood pressure is well controlled. The patient denies any issues with his medications.     He denies any issues with gabapentin, medication improves his chronic back pain.  He notes that Dr. Olivas mentioned a spinal cord stimulator, if pain persisted.     The patient reports that he had completed blood work a few weeks ago, but does not have results to review today.  He states that his iron levels were low previously. The patient notes that he started taking an iron supplement. After that, iron levels became too high.       Answers submitted by the patient for this visit:  Primary Reason for Visit (Submitted on 9/19/2023)  What is the primary reason for your visit?: Other  Other (Submitted on 9/19/2023)  Please describe your symptoms.: Lower back pain  Have you had these symptoms before?: Yes  How long have you been having these symptoms?: Greater than 2 weeks  Please list any medications you are currently taking for this condition.: Gabapentin 600 mg 3 times daily  Please describe any probable cause for these symptoms. : Severe degenerative disc disease    The following portions of the patient's history were reviewed and updated as appropriate: allergies, current medications, past family history, past medical history, past social history, past surgical history, and problem list.    Objective      Physical Exam  Vitals reviewed.   Cardiovascular:      Rate and Rhythm: Normal rate.      Heart sounds: Normal heart sounds.   Pulmonary:      Effort: Pulmonary effort is normal.      Breath sounds: Normal breath sounds.   Neurological:      Mental Status: He is alert.      Result Review :                Assessment and Plan    Diagnoses and all orders for this visit:    1. Primary hypertension (Primary)  Comments:  -     CBC &  Differential; Future  -     Comprehensive Metabolic Panel; Future  -     Lipid Panel With / Chol / HDL Ratio; Future  Orders:  -     CBC & Differential; Future  -     Comprehensive Metabolic Panel; Future  -     Lipid Panel With / Chol / HDL Ratio; Future  -     losartan (COZAAR) 25 MG tablet; Take 1 tablet by mouth Daily.  Dispense: 90 tablet; Refill: 0    2. Neural foraminal stenosis of lumbar spine  Comments:  gabapentin (NEURONT CBC & Differential; Future, Comprehensive Metabolic Panel; Future, Lipid Panel With / Chol / HDL Ratio; Future  Orders:  -     gabapentin (NEURONTIN) 600 MG tablet; Take 1 tablet by mouth 3 (Three) Times a Day.  Dispense: 90 tablet; Refill: 5  -     CBC & Differential; Future  -     Comprehensive Metabolic Panel; Future  -     Lipid Panel With / Chol / HDL Ratio; Future    3. Chronic bilateral low back pain with left-sided sciatica  Comments:  gabapentin (NEURONTIN) 600 MG tablet; Take 1 tablet by mouth 3 (Three) Times a Day.  Dispense: 90 tablet; Refill: 5  Orders:  -     gabapentin (NEURONTIN) 600 MG tablet; Take 1 tablet by mouth 3 (Three) Times a Day.  Dispense: 90 tablet; Refill: 5  -     CBC & Differential; Future  -     Comprehensive Metabolic Panel; Future  -     Lipid Panel With / Chol / HDL Ratio; Future    4. Spinal stenosis of lumbar region, unspecified whether neurogenic claudication present  Comments:  gabapentin (NEURONTIN) 600 MG tablet; Take 1 tablet by mouth 3 (Three) Times a Day.  Dispense: 90 tablet; Refill: 5  Orders:  -     gabapentin (NEURONTIN) 600 MG tablet; Take 1 tablet by mouth 3 (Three) Times a Day.  Dispense: 90 tablet; Refill: 5  -     CBC & Differential; Future  -     Comprehensive Metabolic Panel; Future  -     Lipid Panel With / Chol / HDL Ratio; Future    5. Screening for hyperlipidemia  Comments:  -     CBC & Differential; Future  -     Comprehensive Metabolic Panel; Future  -     Lipid Panel With / Chol / HDL Ratio; Future  Orders:  -     CBC &  Differential; Future  -     Comprehensive Metabolic Panel; Future  -     Lipid Panel With / Chol / HDL Ratio; Future    LESLY query complete. Treatment plan to include limited course of prescribed  controlled substance. Risks including addiction, benefits, and alternatives presented to patient.     He will follow up with [facility] for further treatment of chronic back pain.       Follow Up   Return in about 6 months (around 3/20/2024) for Annual.  Patient was given instructions and counseling regarding his condition or for health maintenance advice. Please see specific information pulled into the AVS if appropriate.   Transcribed from ambient dictation for Gissel Hassan DO by Olamide Peñaloza.  09/20/23   11:52 EDT    Patient or patient representative verbalized consent to the visit recording.  I have personally performed the services described in this document as transcribed by the above individual, and it is both accurate and complete.

## 2023-11-10 ENCOUNTER — TELEPHONE (OUTPATIENT)
Dept: FAMILY MEDICINE CLINIC | Facility: CLINIC | Age: 43
End: 2023-11-10
Payer: COMMERCIAL

## 2023-11-10 DIAGNOSIS — I10 PRIMARY HYPERTENSION: ICD-10-CM

## 2023-11-10 NOTE — TELEPHONE ENCOUNTER
Pt has been monitoring his blood pressure, it has still been running high. He wanted to know if he possibly needed to increase his blood pressure medication. He thinks that it might be from where he is pain when he gets off of work. He wanted to know if something could be called in for him because the ibuprofen is not helping. He mentioned possibly an antiinflammatory.

## 2023-11-13 DIAGNOSIS — I10 PRIMARY HYPERTENSION: ICD-10-CM

## 2023-11-14 RX ORDER — LOSARTAN POTASSIUM 25 MG/1
25 TABLET ORAL DAILY
Qty: 90 TABLET | Refills: 0 | OUTPATIENT
Start: 2023-11-14

## 2023-11-14 RX ORDER — MELOXICAM 15 MG/1
15 TABLET ORAL DAILY
Qty: 90 TABLET | Refills: 1 | Status: SHIPPED | OUTPATIENT
Start: 2023-11-14

## 2023-11-15 ENCOUNTER — LAB (OUTPATIENT)
Dept: FAMILY MEDICINE CLINIC | Facility: CLINIC | Age: 43
End: 2023-11-15
Payer: COMMERCIAL

## 2023-11-15 DIAGNOSIS — Z13.220 SCREENING FOR HYPERLIPIDEMIA: ICD-10-CM

## 2023-11-15 DIAGNOSIS — M54.42 CHRONIC BILATERAL LOW BACK PAIN WITH LEFT-SIDED SCIATICA: ICD-10-CM

## 2023-11-15 DIAGNOSIS — I10 PRIMARY HYPERTENSION: ICD-10-CM

## 2023-11-15 DIAGNOSIS — M48.061 NEURAL FORAMINAL STENOSIS OF LUMBAR SPINE: ICD-10-CM

## 2023-11-15 DIAGNOSIS — M48.061 SPINAL STENOSIS OF LUMBAR REGION, UNSPECIFIED WHETHER NEUROGENIC CLAUDICATION PRESENT: ICD-10-CM

## 2023-11-15 DIAGNOSIS — G89.29 CHRONIC BILATERAL LOW BACK PAIN WITH LEFT-SIDED SCIATICA: ICD-10-CM

## 2023-11-15 NOTE — TELEPHONE ENCOUNTER
What are some of the blood pressure readings?   Ibuprofen is an anti-inflammatory. We can change it to a different one. Will send meloxicam to his pharmacy.

## 2023-11-16 LAB
ALBUMIN SERPL-MCNC: 4.6 G/DL (ref 3.5–5.2)
ALBUMIN/GLOB SERPL: 1.8 G/DL
ALP SERPL-CCNC: 53 U/L (ref 39–117)
ALT SERPL-CCNC: 31 U/L (ref 1–41)
AST SERPL-CCNC: 34 U/L (ref 1–40)
BASOPHILS # BLD MANUAL: 0.08 10*3/MM3 (ref 0–0.2)
BASOPHILS NFR BLD MANUAL: 1 % (ref 0–1.5)
BILIRUB SERPL-MCNC: 0.2 MG/DL (ref 0–1.2)
BUN SERPL-MCNC: 26 MG/DL (ref 6–20)
BUN/CREAT SERPL: 24.1 (ref 7–25)
CALCIUM SERPL-MCNC: 9.7 MG/DL (ref 8.6–10.5)
CHLORIDE SERPL-SCNC: 98 MMOL/L (ref 98–107)
CHOLEST SERPL-MCNC: 147 MG/DL (ref 0–200)
CHOLEST/HDLC SERPL: 2.94 {RATIO}
CO2 SERPL-SCNC: 31.2 MMOL/L (ref 22–29)
CREAT SERPL-MCNC: 1.08 MG/DL (ref 0.76–1.27)
DIFFERENTIAL COMMENT: NORMAL
EGFRCR SERPLBLD CKD-EPI 2021: 86.8 ML/MIN/1.73
EOSINOPHIL # BLD MANUAL: 0.08 10*3/MM3 (ref 0–0.4)
EOSINOPHIL NFR BLD MANUAL: 1 % (ref 0.3–6.2)
ERYTHROCYTE [DISTWIDTH] IN BLOOD BY AUTOMATED COUNT: 13.7 % (ref 12.3–15.4)
GLOBULIN SER CALC-MCNC: 2.5 GM/DL
GLUCOSE SERPL-MCNC: 80 MG/DL (ref 65–99)
HCT VFR BLD AUTO: 46.7 % (ref 37.5–51)
HDLC SERPL-MCNC: 50 MG/DL (ref 40–60)
HGB BLD-MCNC: 14.6 G/DL (ref 13–17.7)
LDLC SERPL CALC-MCNC: 88 MG/DL (ref 0–100)
LYMPHOCYTES # BLD MANUAL: 1.75 10*3/MM3 (ref 0.7–3.1)
LYMPHOCYTES NFR BLD MANUAL: 21.9 % (ref 19.6–45.3)
MCH RBC QN AUTO: 23.1 PG (ref 26.6–33)
MCHC RBC AUTO-ENTMCNC: 31.3 G/DL (ref 31.5–35.7)
MCV RBC AUTO: 73.9 FL (ref 79–97)
MONOCYTES # BLD MANUAL: 0.66 10*3/MM3 (ref 0.1–0.9)
MONOCYTES NFR BLD MANUAL: 8.3 % (ref 5–12)
NEUTROPHILS # BLD MANUAL: 5.42 10*3/MM3 (ref 1.7–7)
NEUTROPHILS NFR BLD MANUAL: 67.7 % (ref 42.7–76)
NRBC BLD AUTO-RTO: 0.1 /100 WBC (ref 0–0.2)
PLATELET # BLD AUTO: 282 10*3/MM3 (ref 140–450)
PLATELET BLD QL SMEAR: NORMAL
POTASSIUM SERPL-SCNC: 4.9 MMOL/L (ref 3.5–5.2)
PROT SERPL-MCNC: 7.1 G/DL (ref 6–8.5)
RBC # BLD AUTO: 6.32 10*6/MM3 (ref 4.14–5.8)
RBC MORPH BLD: NORMAL
SODIUM SERPL-SCNC: 136 MMOL/L (ref 136–145)
TRIGL SERPL-MCNC: 39 MG/DL (ref 0–150)
VLDLC SERPL CALC-MCNC: 9 MG/DL (ref 5–40)
WBC # BLD AUTO: 8 10*3/MM3 (ref 3.4–10.8)

## 2023-11-16 NOTE — TELEPHONE ENCOUNTER
"Reports his BP is always elevated when he gets off work in the morning before he takes his BP meds.  Diastolic has been \"good\" but reports the systolic running around 155. After taking his meds and going to bed, when he wakes up his numbers are back to 120's.   "

## 2023-11-17 DIAGNOSIS — R71.8 ABNORMAL RBC MORPHOLOGY: Primary | ICD-10-CM

## 2023-11-17 RX ORDER — LOSARTAN POTASSIUM 50 MG/1
50 TABLET ORAL DAILY
Qty: 90 TABLET | Refills: 1 | Status: SHIPPED | OUTPATIENT
Start: 2023-11-17

## 2024-01-10 ENCOUNTER — OFFICE VISIT (OUTPATIENT)
Dept: FAMILY MEDICINE CLINIC | Facility: CLINIC | Age: 44
End: 2024-01-10
Payer: COMMERCIAL

## 2024-01-10 VITALS
TEMPERATURE: 98.4 F | SYSTOLIC BLOOD PRESSURE: 138 MMHG | HEART RATE: 82 BPM | OXYGEN SATURATION: 97 % | BODY MASS INDEX: 28.06 KG/M2 | WEIGHT: 218.6 LBS | DIASTOLIC BLOOD PRESSURE: 92 MMHG | HEIGHT: 74 IN | RESPIRATION RATE: 18 BRPM

## 2024-01-10 DIAGNOSIS — Z23 NEED FOR VACCINATION FOR PNEUMOCOCCUS: ICD-10-CM

## 2024-01-10 DIAGNOSIS — Z23 NEEDS FLU SHOT: ICD-10-CM

## 2024-01-10 DIAGNOSIS — M51.36 DDD (DEGENERATIVE DISC DISEASE), LUMBAR: ICD-10-CM

## 2024-01-10 DIAGNOSIS — E83.19 IRON EXCESS: ICD-10-CM

## 2024-01-10 DIAGNOSIS — R71.8 ABNORMAL RBC MORPHOLOGY: ICD-10-CM

## 2024-01-10 DIAGNOSIS — I10 PRIMARY HYPERTENSION: Primary | ICD-10-CM

## 2024-01-10 RX ORDER — LOSARTAN POTASSIUM 50 MG/1
50 TABLET ORAL DAILY
Qty: 90 TABLET | Refills: 1 | Status: SHIPPED | OUTPATIENT
Start: 2024-01-10

## 2024-01-10 RX ORDER — TRAZODONE HYDROCHLORIDE 100 MG/1
100 TABLET ORAL NIGHTLY
COMMUNITY
Start: 2024-01-03

## 2024-01-10 NOTE — LETTER
[facility]  Vaccine Consent Form    Patient Name:  Farhan Kaye  Patient :  1980     Vaccine(s) Ordered    Fluzone >6 Months ([id number])  Pneumococcal Conjugate Vaccine 20-Valent (PCV20)        Screening Checklist  The following questions should be completed prior to vaccination. If you answer “yes” to any question, it does not necessarily mean you should not be vaccinated. It just means we may need to clarify or ask more questions. If a question is unclear, please ask your healthcare provider to explain it.    Yes No   Any fever or moderate to severe illness today (mild illness and/or antibiotic treatment are not contraindications)?     Do you have a history of a serious reaction to any previous vaccinations, such as anaphylaxis, encephalopathy within 7 days, Guillain-[place] syndrome within 6 weeks, seizure?     Have you received any live vaccine(s) (e.g MMR, RED) or any other vaccines in the last month (to ensure duplicate doses aren't given)?     Do you have an anaphylactic allergy to latex (DTaP, DTaP-IPV, Hep A, Hep B, MenB, RV, Td, Tdap), baker’s yeast (Hep B, HPV), polysorbates (RSV, nirsevimab, PCV 20, Rotavirrus, Tdap, Shingrix), or gelatin (RED, MMR)?     Do you have an anaphylactic allergy to neomycin (Rabies, RED, MMR, IPV, Hep A), polymyxin B (IPV), or streptomycin (IPV)?      Any cancer, leukemia, AIDS, or other immune system disorder? (RED, MMR, RV)     Do you have a parent, brother, or sister with an immune system problem (if immune competence of vaccine recipient clinically verified, can proceed)? (MMR, RED)     Any recent steroid treatments for >2 weeks, chemotherapy, or radiation treatment? (RED, MMR)     Have you received antibody-containing blood transfusions or IVIG in the past 11 months (recommended interval is dependent on product)? (MMR, RED)     Have you taken antiviral drugs (acyclovir, famciclovir, valacyclovir for RED) in the last 24 or 48 hours, respectively?      Are  "you pregnant or planning to become pregnant within 1 month? (RED, MMR, HPV, IPV, MenB, Abrexvy; For Hep B- refer to Engerix-B; For RSV - Abrysvo is indicated for 32-36 weeks of pregnancy from September to January)     For infants, have you ever been told your child has had intussusception or a medical emergency involving obstruction of the intestine (Rotavirus)? If not for an infant, can skip this question.         *Ordering Physicians/APC should be consulted if \"yes\" is checked by the patient or guardian above.  I have received, read, and understand the Vaccine Information Statement (VIS) for each vaccine ordered.  I have considered my or my child's health status as well as the health status of my close contacts.  I have taken the opportunity to discuss my vaccine questions with my or my child's health care provider.   I have requested that the ordered vaccine(s) be given to me or my child.  I understand the benefits and risks of the vaccines.  I understand that I should remain in the clinic for 15 minutes after receiving the vaccine(s).  _________________________________________________________  Signature of Patient or Parent/Legal Guardian ____________________  Date     "

## 2024-01-10 NOTE — PROGRESS NOTES
Follow-up (F/u on b/p med change and HTN.  Had questions concerning new insurance. Will complete repeat labs while he's here.)    Subjective          Farhan Kaye presents to [facility]  History of Present Illness    He is going to need to update FMLA in future.   Current FMLA hasn't ended yet, but he will drop off forms when it is due.     He changed his insurance recently, as he wants to do Sublocade treatment instead of Suboxone.    Chronic pain treated with meloxicam and gabapentin.   Medication is managing symptoms well overall.     HTN  He is taking losartan 50 mg daily. Reports that home readings are 120s/70s.   Denies headaches, vision changes, chest discomfort.     Answers submitted by the patient for this visit:  Primary Reason for Visit (Submitted on 1/10/2024)  What is the primary reason for your visit?: Other  Other (Submitted on 1/10/2024)  Please describe your symptoms.: its for blood work and renewal of my fmla for wirk  Have you had these symptoms before?: No  How long have you been having these symptoms?: Greater than 2 weeks  Please list any medications you are currently taking for this condition.: na  Please describe any probable cause for these symptoms. : na  Chief Complaint  The following portions of the patient's history were reviewed and updated as appropriate: allergies, current medications, past family history, past medical history, past social history, past surgical history, and problem list.    Objective      Physical Exam  Vitals reviewed.   Cardiovascular:      Rate and Rhythm: Normal rate.      Heart sounds: Normal heart sounds.   Pulmonary:      Effort: Pulmonary effort is normal.      Breath sounds: Normal breath sounds.   Neurological:      Mental Status: He is alert.        Result Review :                Assessment and Plan    Diagnoses and all orders for this visit:    1. Primary hypertension (Primary)  Comments:  He reports normal home BP  readings. Continue losartan 50 mg daily  Orders:  -     CBC & Differential  -     Peripheral Blood Smear  -     Iron  -     Ferritin  -     Hemochromatosis Mutation  -     losartan (COZAAR) 50 MG tablet; Take 1 tablet by mouth Daily.  Dispense: 90 tablet; Refill: 1  -     Comprehensive Metabolic Panel  -     TSH    2. Abnormal RBC morphology  -     CBC & Differential  -     Peripheral Blood Smear  -     Iron  -     Ferritin  -     Hemochromatosis Mutation  -     Comprehensive Metabolic Panel  -     TSH    3. Iron excess  Comments:  Will check labs today  Orders:  -     CBC & Differential  -     Peripheral Blood Smear  -     Iron  -     Ferritin  -     Hemochromatosis Mutation  -     Comprehensive Metabolic Panel  -     TSH    4. DDD (degenerative disc disease), lumbar  Comments:  Will update FMLA when due, he will drop forms off.    5. Needs flu shot  -     Fluzone >6 Months ([id number])    6. Need for vaccination for pneumococcus  -     Pneumococcal Conjugate Vaccine 20-Valent (PCV20)        Follow Up   Return in about 6 months (around 7/10/2024) for Recheck.  Patient was given instructions and counseling regarding his condition or for health maintenance advice. Please see specific information pulled into the AVS if appropriate.

## 2024-01-17 LAB
ALBUMIN SERPL-MCNC: 4.6 G/DL (ref 4.1–5.1)
ALBUMIN/GLOB SERPL: 1.8 {RATIO} (ref 1.2–2.2)
ALP SERPL-CCNC: 48 IU/L (ref 44–121)
ALT SERPL-CCNC: 31 IU/L (ref 0–44)
AST SERPL-CCNC: 39 IU/L (ref 0–40)
BASOPHILS # BLD AUTO: 0 X10E3/UL (ref 0–0.2)
BASOPHILS NFR BLD AUTO: 0 %
BILIRUB SERPL-MCNC: 0.6 MG/DL (ref 0–1.2)
BUN SERPL-MCNC: 23 MG/DL (ref 6–24)
BUN/CREAT SERPL: 20 (ref 9–20)
CALCIUM SERPL-MCNC: 9.9 MG/DL (ref 8.7–10.2)
CHLORIDE SERPL-SCNC: 99 MMOL/L (ref 96–106)
CO2 SERPL-SCNC: 24 MMOL/L (ref 20–29)
CREAT SERPL-MCNC: 1.14 MG/DL (ref 0.76–1.27)
EGFRCR SERPLBLD CKD-EPI 2021: 81 ML/MIN/1.73
EOSINOPHIL # BLD AUTO: 0.1 X10E3/UL (ref 0–0.4)
EOSINOPHIL NFR BLD AUTO: 1 %
ERYTHROCYTE [DISTWIDTH] IN BLOOD BY AUTOMATED COUNT: 17.2 % (ref 11.6–15.4)
FERRITIN SERPL-MCNC: 102 NG/ML (ref 30–400)
GLOBULIN SER CALC-MCNC: 2.5 G/DL (ref 1.5–4.5)
GLUCOSE SERPL-MCNC: 79 MG/DL (ref 70–99)
HCT VFR BLD AUTO: 47.8 % (ref 37.5–51)
HFE GENE MUT ANL BLD/T: NORMAL
HGB BLD-MCNC: 15.1 G/DL (ref 13–17.7)
IMM GRANULOCYTES # BLD AUTO: 0 X10E3/UL (ref 0–0.1)
IMM GRANULOCYTES NFR BLD AUTO: 0 %
IMP & REVIEW OF LAB RESULTS: NORMAL
IRON SERPL-MCNC: 113 UG/DL (ref 38–169)
LYMPHOCYTES # BLD AUTO: 1.8 X10E3/UL (ref 0.7–3.1)
LYMPHOCYTES NFR BLD AUTO: 21 %
MCH RBC QN AUTO: 23.3 PG (ref 26.6–33)
MCHC RBC AUTO-ENTMCNC: 31.6 G/DL (ref 31.5–35.7)
MCV RBC AUTO: 74 FL (ref 79–97)
MONOCYTES # BLD AUTO: 0.6 X10E3/UL (ref 0.1–0.9)
MONOCYTES NFR BLD AUTO: 7 %
NEUTROPHILS # BLD AUTO: 6.1 X10E3/UL (ref 1.4–7)
NEUTROPHILS NFR BLD AUTO: 71 %
PATH INTERP BLD-IMP: ABNORMAL
PATH REV BLD -IMP: ABNORMAL
PATHOLOGIST NAME: ABNORMAL
PLATELET # BLD AUTO: 291 X10E3/UL (ref 150–450)
POTASSIUM SERPL-SCNC: 4.5 MMOL/L (ref 3.5–5.2)
PROT SERPL-MCNC: 7.1 G/DL (ref 6–8.5)
RBC # BLD AUTO: 6.48 X10E6/UL (ref 4.14–5.8)
SODIUM SERPL-SCNC: 140 MMOL/L (ref 134–144)
TSH SERPL DL<=0.005 MIU/L-ACNC: 2.48 UIU/ML (ref 0.45–4.5)
WBC # BLD AUTO: 8.7 X10E3/UL (ref 3.4–10.8)

## 2024-01-19 DIAGNOSIS — R71.8 ABNORMAL RBC INDICES: Primary | ICD-10-CM

## 2024-01-19 DIAGNOSIS — R71.8 ABNORMAL RBC MORPHOLOGY: ICD-10-CM

## 2024-02-07 DIAGNOSIS — M54.42 CHRONIC BILATERAL LOW BACK PAIN WITH LEFT-SIDED SCIATICA: ICD-10-CM

## 2024-02-07 DIAGNOSIS — M48.061 NEURAL FORAMINAL STENOSIS OF LUMBAR SPINE: ICD-10-CM

## 2024-02-07 DIAGNOSIS — G89.29 CHRONIC BILATERAL LOW BACK PAIN WITH LEFT-SIDED SCIATICA: ICD-10-CM

## 2024-02-07 DIAGNOSIS — M48.061 SPINAL STENOSIS OF LUMBAR REGION, UNSPECIFIED WHETHER NEUROGENIC CLAUDICATION PRESENT: ICD-10-CM

## 2024-02-07 RX ORDER — GABAPENTIN 600 MG/1
600 TABLET ORAL 3 TIMES DAILY
Qty: 90 TABLET | Refills: 5 | Status: SHIPPED | OUTPATIENT
Start: 2024-02-07

## 2024-02-19 ENCOUNTER — TELEPHONE (OUTPATIENT)
Dept: FAMILY MEDICINE CLINIC | Facility: CLINIC | Age: 44
End: 2024-02-19
Payer: COMMERCIAL

## 2024-02-19 NOTE — TELEPHONE ENCOUNTER
RELAY:    Dr Hassan has completed her part of his FMLA but pt hasn't completed his.     NEED EMPLOYER NAME & CONTACT  NEED PTS JOB TITLE  NEED ESSENTIAL JOB FUNCTIONS  REGULAR WORK SCHEDULE

## 2024-03-12 NOTE — TELEPHONE ENCOUNTER
PATIENT CALLING TO SEE IF HE CAN COME AND  HIS FMLA PAPER WORK. PATIENT CAN FILL OUT HIS PART. PLEASE LET PATIENT KNOW IF HE CAN COME AND GET PAPER WORK.

## 2024-03-13 NOTE — TELEPHONE ENCOUNTER
Pt informed forms would be at  and for him to complete the missing information. Copy needs to be made so we can fax forms over for him, and he has a copy for himself. Pt aware copy needs to be made.

## 2024-04-08 RX ORDER — MELOXICAM 15 MG/1
15 TABLET ORAL DAILY
Qty: 90 TABLET | Refills: 1 | OUTPATIENT
Start: 2024-04-08

## 2024-06-06 ENCOUNTER — TELEPHONE (OUTPATIENT)
Dept: FAMILY MEDICINE CLINIC | Facility: CLINIC | Age: 44
End: 2024-06-06

## 2024-06-06 NOTE — TELEPHONE ENCOUNTER
PATIENT IS SCHEDULED WITH A PROVIDER THAT IS NOT THEIR PCP FOR A PHYSICAL    THE APPOINTMENT IS ON 06.10.24 AT 3 PM WITH VITALIY ROBBINS. PATIENT WANTED SOONER DUE TO DR. SIMENTAL BEING BOOKED OUT.

## 2024-06-17 ENCOUNTER — OFFICE VISIT (OUTPATIENT)
Dept: FAMILY MEDICINE CLINIC | Facility: CLINIC | Age: 44
End: 2024-06-17
Payer: COMMERCIAL

## 2024-06-17 VITALS
DIASTOLIC BLOOD PRESSURE: 84 MMHG | RESPIRATION RATE: 16 BRPM | SYSTOLIC BLOOD PRESSURE: 128 MMHG | OXYGEN SATURATION: 98 % | WEIGHT: 202.6 LBS | HEART RATE: 88 BPM | TEMPERATURE: 97.3 F | HEIGHT: 74 IN | BODY MASS INDEX: 26 KG/M2

## 2024-06-17 DIAGNOSIS — I10 PRIMARY HYPERTENSION: ICD-10-CM

## 2024-06-17 DIAGNOSIS — Z00.00 ANNUAL PHYSICAL EXAM: Primary | ICD-10-CM

## 2024-06-17 DIAGNOSIS — R71.8 ABNORMAL RBC MORPHOLOGY: ICD-10-CM

## 2024-06-17 DIAGNOSIS — G47.00 INSOMNIA, UNSPECIFIED TYPE: ICD-10-CM

## 2024-06-17 DIAGNOSIS — M51.36 DDD (DEGENERATIVE DISC DISEASE), LUMBAR: ICD-10-CM

## 2024-06-17 DIAGNOSIS — Z12.5 SCREENING PSA (PROSTATE SPECIFIC ANTIGEN): ICD-10-CM

## 2024-06-17 DIAGNOSIS — Z12.11 SCREENING FOR COLON CANCER: ICD-10-CM

## 2024-06-17 DIAGNOSIS — Z13.29 SCREENING FOR ENDOCRINE DISORDER: ICD-10-CM

## 2024-06-17 DIAGNOSIS — M19.90 ARTHRITIS: ICD-10-CM

## 2024-06-17 DIAGNOSIS — Z13.6 SCREENING FOR CARDIOVASCULAR CONDITION: ICD-10-CM

## 2024-06-17 PROCEDURE — 99396 PREV VISIT EST AGE 40-64: CPT | Performed by: NURSE PRACTITIONER

## 2024-06-17 RX ORDER — TRAZODONE HYDROCHLORIDE 50 MG/1
50 TABLET ORAL NIGHTLY
Qty: 90 TABLET | Refills: 1 | Status: SHIPPED | OUTPATIENT
Start: 2024-06-17

## 2024-06-17 RX ORDER — MELOXICAM 15 MG/1
15 TABLET ORAL DAILY
Qty: 90 TABLET | Refills: 1 | Status: SHIPPED | OUTPATIENT
Start: 2024-06-17

## 2024-06-17 NOTE — PROGRESS NOTES
Patient Name: Farhan Kaye  : 1980   MRN: [id number]     Chief Complaint:    Chief Complaint   Patient presents with    Annual Exam       History of Present Illness: Farhan Kaye is a 43 y.o. male who is here today for their annual health maintenance and physical. 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   Respiratory:  Negative for cough.    Cardiovascular:  Negative for chest pain.   Gastrointestinal:  Negative for abdominal pain.   Genitourinary:  Negative for dysuria.   Musculoskeletal:  Positive for arthralgias and back pain.   Skin:  Negative for rash.   Neurological:  Negative for headache.       Past Medical History, Social History, Family History and Care Team were all reviewed with patient and updated as appropriate.     Medications:     Current Outpatient Medications:     aspirin  MG tablet, Take 1 tablet by mouth Daily., Disp: , Rfl:     gabapentin (NEURONTIN) 600 MG tablet, Take 1 tablet by mouth 3 (Three) Times a Day., Disp: 90 tablet, Rfl: 5    losartan (COZAAR) 50 MG tablet, Take 1 tablet by mouth Daily., Disp: 90 tablet, Rfl: 1    meloxicam (Mobic) 15 MG tablet, Take 1 tablet by mouth Daily., Disp: 90 tablet, Rfl: 1    traZODone (DESYREL) 50 MG tablet, Take 1 tablet by mouth Every Night., Disp: 90 tablet, Rfl: 1    Allergies:   No Known Allergies        PHQ-2 Total Score: 0   PHQ-9 Total Score: 0      Intimate partner violence: (Screen on initial visit, older adult with injury or evidence of neglect):  Violence can be a problem in many people's lives, so I now ask every patient about trauma or abuse they may have experienced in a  "relationship.  Stress/Safety - Do you feel safe in your relationship?  Afraid/Abused - Have you ever been in a relationship where you were threatened, hurt, or afraid?  Friend/Family - Are your friends aware you have been hurt?  Emergency Plan - Do you have a safe place to go and the resources you need in an emergency?    Osteoporosis:   Men: history of low trauma fracture, androgen deprivation therapy for prostate cancer, hypogonadism, primary hyperparathyroidism, intestinal disorders.       Physical Exam:  Vital Signs:   Vitals:    06/17/24 0805   BP: 128/84   Pulse: 88   Resp: 16   Temp: 97.3 °F (36.3 °C)   SpO2: 98%   Weight: 91.9 kg (202 lb 9.6 oz)   Height: 188 cm (74\")   PainSc:   3   PainLoc: Back     Body mass index is 26.01 kg/m².     Physical Exam  Vitals and nursing note reviewed.   Constitutional:       General: He is awake.      Appearance: Normal appearance. He is well-developed.   HENT:      Head: Normocephalic and atraumatic.      Right Ear: Tympanic membrane, ear canal and external ear normal.      Left Ear: Tympanic membrane, ear canal and external ear normal.      Nose: Nose normal.      Mouth/Throat:      Mouth: Mucous membranes are moist.      Pharynx: Oropharynx is clear.   Eyes:      Extraocular Movements: Extraocular movements intact.      Conjunctiva/sclera: Conjunctivae normal.      Pupils: Pupils are equal, round, and reactive to light.   Neck:      Thyroid: No thyromegaly.   Cardiovascular:      Rate and Rhythm: Normal rate and regular rhythm.      Pulses: Normal pulses.      Heart sounds: Normal heart sounds.   Pulmonary:      Effort: Pulmonary effort is normal.      Breath sounds: Normal breath sounds.   Abdominal:      General: Bowel sounds are normal.      Palpations: Abdomen is soft.   Musculoskeletal:         General: Normal range of motion.      Cervical back: Normal range of motion and neck supple.      Right lower leg: No edema.      Left lower leg: No edema.   Lymphadenopathy: "      Cervical: No cervical adenopathy.   Skin:     General: Skin is warm.      Capillary Refill: Capillary refill takes less than 2 seconds.   Neurological:      General: No focal deficit present.      Mental Status: He is alert and oriented to person, place, and time.   Psychiatric:         Mood and Affect: Mood normal.         Behavior: Behavior normal. Behavior is cooperative.         Thought Content: Thought content normal.         Judgment: Judgment normal.         Procedures      Assessment/Plan:   Diagnoses and all orders for this visit:    1. Annual physical exam (Primary)  -     Comprehensive Metabolic Panel  -     CBC Auto Differential  -     Hemoglobin A1c  -     Vitamin D,25-Hydroxy  -     Vitamin B12 & Folate  -     PSA SCREENING  -     Ambulatory Referral For Screening Colonoscopy    2. Primary hypertension  -     Comprehensive Metabolic Panel  -     CBC Auto Differential    3. Abnormal RBC morphology  -     Comprehensive Metabolic Panel  -     CBC Auto Differential    4. Screening for endocrine disorder  -     T4, Free  -     TSH    5. Screening for cardiovascular condition  -     Lipid Panel    6. Screening PSA (prostate specific antigen)  -     PSA SCREENING    7. Screening for colon cancer  -     Ambulatory Referral For Screening Colonoscopy    8. Insomnia, unspecified type  -     traZODone (DESYREL) 50 MG tablet; Take 1 tablet by mouth Every Night.  Dispense: 90 tablet; Refill: 1    9. Arthritis  -     meloxicam (Mobic) 15 MG tablet; Take 1 tablet by mouth Daily.  Dispense: 90 tablet; Refill: 1    10. DDD (degenerative disc disease), lumbar  -     meloxicam (Mobic) 15 MG tablet; Take 1 tablet by mouth Daily.  Dispense: 90 tablet; Refill: 1       Health Maintenance, Male  A healthy lifestyle and preventive care is important for your health and wellness. Ask your health care provider about what schedule of regular examinations is right for you.  What should I know about weight and diet?    Eat a  Healthy Diet  Eat plenty of vegetables, fruits, whole grains, low-fat dairy products, and lean protein.  Do not eat a lot of foods high in solid fats, added sugars, or salt.     Maintain a Healthy Weight  Regular exercise can help you achieve or maintain a healthy weight. You should:  Do at least 150 minutes of exercise each week. The exercise should increase your heart rate and make you sweat (moderate-intensity exercise).  Do strength-training exercises at least twice a week.     Watch Your Levels of Cholesterol and Blood Lipids  Have your blood tested for lipids and cholesterol every 5 years starting at 35 years of age. If you are at high risk for heart disease, you should start having your blood tested when you are 20 years old. You may need to have your cholesterol levels checked more often if:  Your lipid or cholesterol levels are high.  You are older than 50 years of age.  You are at high risk for heart disease.     What should I know about cancer screening?  Many types of cancers can be detected early and may often be prevented.  Lung Cancer  You should be screened every year for lung cancer if:  You are a current smoker who has smoked for at least 30 years.  You are a former smoker who has quit within the past 15 years.  Talk to your health care provider about your screening options, when you should start screening, and how often you should be screened.     Colorectal Cancer  Routine colorectal cancer screening usually begins at 50 years of age and should be repeated every 5-10 years until you are 75 years old. You may need to be screened more often if early forms of precancerous polyps or small growths are found. Your health care provider may recommend screening at an earlier age if you have risk factors for colon cancer.  Your health care provider may recommend using home test kits to check for hidden blood in the stool.  A small camera at the end of a tube can be used to examine your colon (sigmoidoscopy  or colonoscopy). This checks for the earliest forms of colorectal cancer.     Prostate and Testicular Cancer  Depending on your age and overall health, your health care provider may do certain tests to screen for prostate and testicular cancer.  Talk to your health care provider about any symptoms or concerns you have about testicular or prostate cancer.     Skin Cancer  Check your skin from head to toe regularly.  Tell your health care provider about any new moles or changes in moles, especially if:  There is a change in a mole’s size, shape, or color.  You have a mole that is larger than a pencil eraser.  Always use sunscreen. Apply sunscreen liberally and repeat throughout the day.  Protect yourself by wearing long sleeves, pants, a wide-brimmed hat, and sunglasses when outside.     What should I know about heart disease, diabetes, and high blood pressure?  If you are 18-39 years of age, have your blood pressure checked every 3-5 years. If you are 40 years of age or older, have your blood pressure checked every year. You should have your blood pressure measured twice--once when you are at a hospital or clinic, and once when you are not at a hospital or clinic. Record the average of the two measurements. To check your blood pressure when you are not at a hospital or clinic, you can use:  An automated blood pressure machine at a pharmacy.  A home blood pressure monitor.  Talk to your health care provider about your target blood pressure.  If you are between 45-79 years old, ask your health care provider if you should take aspirin to prevent heart disease.  Have regular diabetes screenings by checking your fasting blood sugar level.  If you are at a normal weight and have a low risk for diabetes, have this test once every three years after the age of 45.  If you are overweight and have a high risk for diabetes, consider being tested at a younger age or more often.  A one-time screening for abdominal aortic aneurysm  (AAA) by ultrasound is recommended for men aged 65-75 years who are current or former smokers.  What should I know about preventing infection?  Hepatitis B  If you have a higher risk for hepatitis B, you should be screened for this virus. Talk with your health care provider to find out if you are at risk for hepatitis B infection.  Hepatitis C  Blood testing is recommended for:  Everyone born from 1945 through 1965.  Anyone with known risk factors for hepatitis C.     Sexually Transmitted Diseases (STDs)  You should be screened each year for STDs including gonorrhea and chlamydia if:  You are sexually active and are younger than 24 years of age.  You are older than 24 years of age and your health care provider tells you that you are at risk for this type of infection.  Your sexual activity has changed since you were last screened and you are at an increased risk for chlamydia or gonorrhea. Ask your health care provider if you are at risk.  Talk with your health care provider about whether you are at high risk of being infected with HIV. Your health care provider may recommend a prescription medicine to help prevent HIV infection.     What else can I do?    Schedule regular health, dental, and eye exams.  Stay current with your vaccines (immunizations).  Do not use any tobacco products, such as cigarettes, chewing tobacco, and e-cigarettes. If you need help quitting, ask your health care provider.  Limit alcohol intake to no more than 2 drinks per day. One drink equals 12 ounces of beer, 5 ounces of wine, or 1½ ounces of hard liquor.  Do not use street drugs.  Do not share needles.  Ask your health care provider for help if you need support or information about quitting drugs.  Tell your health care provider if you often feel depressed.  Tell your health care provider if you have ever been abused or do not feel safe at home.      This information is not intended to replace advice given to you by your health care  provider. Make sure you discuss any questions you have with your health care provider.  Document Released: 06/15/2009 Document Revised: 08/16/2017 Document Reviewed: 09/20/2016  [employer] Patient Education © 2018 [employer]         Follow Up:   Return for Annual.    Healthcare Maintenance:   Counseling provided on preventative care and vaccines.  Farhan Kaye voices understanding and acceptance of this advice and will call back with any further questions or concerns. AVS with preventive healthcare tips printed for patient.       Consuelo Segovia. [place]   [facility]

## 2024-06-18 LAB
25(OH)D3+25(OH)D2 SERPL-MCNC: 30.7 NG/ML (ref 30–100)
ALBUMIN SERPL-MCNC: 4.2 G/DL (ref 3.5–5.2)
ALBUMIN/GLOB SERPL: 1.9 G/DL
ALP SERPL-CCNC: 52 U/L (ref 39–117)
ALT SERPL-CCNC: 24 U/L (ref 1–41)
AST SERPL-CCNC: 28 U/L (ref 1–40)
BASOPHILS # BLD AUTO: 0.02 10*3/MM3 (ref 0–0.2)
BASOPHILS NFR BLD AUTO: 0.4 % (ref 0–1.5)
BILIRUB SERPL-MCNC: 0.5 MG/DL (ref 0–1.2)
BUN SERPL-MCNC: 22 MG/DL (ref 6–20)
BUN/CREAT SERPL: 19.6 (ref 7–25)
CALCIUM SERPL-MCNC: 9 MG/DL (ref 8.6–10.5)
CHLORIDE SERPL-SCNC: 101 MMOL/L (ref 98–107)
CHOLEST SERPL-MCNC: 117 MG/DL (ref 0–200)
CO2 SERPL-SCNC: 29.2 MMOL/L (ref 22–29)
CREAT SERPL-MCNC: 1.12 MG/DL (ref 0.76–1.27)
EGFRCR SERPLBLD CKD-EPI 2021: 82.6 ML/MIN/1.73
EOSINOPHIL # BLD AUTO: 0.14 10*3/MM3 (ref 0–0.4)
EOSINOPHIL NFR BLD AUTO: 2.9 % (ref 0.3–6.2)
ERYTHROCYTE [DISTWIDTH] IN BLOOD BY AUTOMATED COUNT: 13.8 % (ref 12.3–15.4)
FOLATE SERPL-MCNC: 8.69 NG/ML (ref 4.78–24.2)
GLOBULIN SER CALC-MCNC: 2.2 GM/DL
GLUCOSE SERPL-MCNC: 71 MG/DL (ref 65–99)
HBA1C MFR BLD: 4.7 % (ref 4.8–5.6)
HCT VFR BLD AUTO: 46.1 % (ref 37.5–51)
HDLC SERPL-MCNC: 43 MG/DL (ref 40–60)
HGB BLD-MCNC: 13.7 G/DL (ref 13–17.7)
IMM GRANULOCYTES # BLD AUTO: 0.02 10*3/MM3 (ref 0–0.05)
IMM GRANULOCYTES NFR BLD AUTO: 0.4 % (ref 0–0.5)
LDLC SERPL CALC-MCNC: 62 MG/DL (ref 0–100)
LYMPHOCYTES # BLD AUTO: 1.56 10*3/MM3 (ref 0.7–3.1)
LYMPHOCYTES NFR BLD AUTO: 32.3 % (ref 19.6–45.3)
MCH RBC QN AUTO: 22.3 PG (ref 26.6–33)
MCHC RBC AUTO-ENTMCNC: 29.7 G/DL (ref 31.5–35.7)
MCV RBC AUTO: 75 FL (ref 79–97)
MONOCYTES # BLD AUTO: 0.41 10*3/MM3 (ref 0.1–0.9)
MONOCYTES NFR BLD AUTO: 8.5 % (ref 5–12)
NEUTROPHILS # BLD AUTO: 2.68 10*3/MM3 (ref 1.7–7)
NEUTROPHILS NFR BLD AUTO: 55.5 % (ref 42.7–76)
NRBC BLD AUTO-RTO: 0 /100 WBC (ref 0–0.2)
PLATELET # BLD AUTO: 209 10*3/MM3 (ref 140–450)
POTASSIUM SERPL-SCNC: 4.5 MMOL/L (ref 3.5–5.2)
PROT SERPL-MCNC: 6.4 G/DL (ref 6–8.5)
PSA SERPL-MCNC: 0.76 NG/ML (ref 0–4)
RBC # BLD AUTO: 6.15 10*6/MM3 (ref 4.14–5.8)
SODIUM SERPL-SCNC: 139 MMOL/L (ref 136–145)
T4 FREE SERPL-MCNC: 1.11 NG/DL (ref 0.92–1.68)
TRIGL SERPL-MCNC: 55 MG/DL (ref 0–150)
TSH SERPL DL<=0.005 MIU/L-ACNC: 1.94 UIU/ML (ref 0.27–4.2)
VIT B12 SERPL-MCNC: 576 PG/ML (ref 211–946)
VLDLC SERPL CALC-MCNC: 12 MG/DL (ref 5–40)
WBC # BLD AUTO: 4.83 10*3/MM3 (ref 3.4–10.8)

## 2024-07-03 DIAGNOSIS — I10 PRIMARY HYPERTENSION: ICD-10-CM

## 2024-07-03 RX ORDER — LOSARTAN POTASSIUM 50 MG/1
50 TABLET ORAL DAILY
Qty: 90 TABLET | Refills: 1 | Status: SHIPPED | OUTPATIENT
Start: 2024-07-03

## 2024-07-08 ENCOUNTER — OFFICE VISIT (OUTPATIENT)
Dept: FAMILY MEDICINE CLINIC | Facility: CLINIC | Age: 44
End: 2024-07-08
Payer: COMMERCIAL

## 2024-07-08 VITALS
HEIGHT: 74 IN | SYSTOLIC BLOOD PRESSURE: 128 MMHG | OXYGEN SATURATION: 99 % | HEART RATE: 66 BPM | DIASTOLIC BLOOD PRESSURE: 78 MMHG | RESPIRATION RATE: 16 BRPM | TEMPERATURE: 97.3 F | BODY MASS INDEX: 25.28 KG/M2 | WEIGHT: 197 LBS

## 2024-07-08 DIAGNOSIS — G89.29 CHRONIC BILATERAL LOW BACK PAIN WITH LEFT-SIDED SCIATICA: ICD-10-CM

## 2024-07-08 DIAGNOSIS — Z02.83 ENCOUNTER FOR DRUG SCREENING: ICD-10-CM

## 2024-07-08 DIAGNOSIS — M48.061 NEURAL FORAMINAL STENOSIS OF LUMBAR SPINE: Primary | ICD-10-CM

## 2024-07-08 DIAGNOSIS — M48.061 SPINAL STENOSIS OF LUMBAR REGION, UNSPECIFIED WHETHER NEUROGENIC CLAUDICATION PRESENT: ICD-10-CM

## 2024-07-08 DIAGNOSIS — M54.42 CHRONIC BILATERAL LOW BACK PAIN WITH LEFT-SIDED SCIATICA: ICD-10-CM

## 2024-07-08 DIAGNOSIS — R71.8 ABNORMAL RBC MORPHOLOGY: ICD-10-CM

## 2024-07-08 PROCEDURE — 99214 OFFICE O/P EST MOD 30 MIN: CPT | Performed by: FAMILY MEDICINE

## 2024-07-08 RX ORDER — GABAPENTIN 600 MG/1
600 TABLET ORAL 3 TIMES DAILY
Qty: 90 TABLET | Refills: 5 | Status: SHIPPED | OUTPATIENT
Start: 2024-07-08

## 2024-07-08 NOTE — PROGRESS NOTES
"Chief Complaint  6 mo f/u    Subjective          Farhan Grover Kaye presents to [facility]    History of Present Illness  The patient presents for follow-up.    The patient underwent an radiofrequency ablation (RFA) procedure on his back last week, which has significantly improved his morning stiffness. However, he continues to experience discomfort towards the end of his work shift. Overall, back pain is stable and management. He needs refills of gabapentin added to pharmacy.     The patient has yet to consult with a hematologist, abnormal peripheral smear and chronically elevated RBC. \"Microcytic and hypochromic pattern with anisopoikilocytosis   in the presence of an elevated RBC value\".    He is scheduled for a colonoscopy on Monday.   He is on trazodone 50 mg for sleep.    It has been 3 months since his last Sublocade injection. He is doing well.     The following portions of the patient's history were reviewed and updated as appropriate: allergies, current medications, past family history, past medical history, past social history, past surgical history, and problem list.    Objective      Physical Exam  Vitals reviewed.   Constitutional:       Appearance: Normal appearance.   Pulmonary:      Effort: Pulmonary effort is normal. No respiratory distress.   Neurological:      Mental Status: He is alert.   Psychiatric:         Mood and Affect: Mood normal.         Behavior: Behavior normal.        Physical Exam      Result Review :       Results  Laboratory Studies  Red blood cell count is elevated. White blood cell count is normal. Platelet count is normal.             Assessment and Plan    Diagnoses and all orders for this visit:    1. Neural foraminal stenosis of lumbar spine (Primary)  -     Compliance Drug Analysis, Ur - Urine, Clean Catch  -     gabapentin (NEURONTIN) 600 MG tablet; Take 1 tablet by mouth 3 (Three) Times a Day.  Dispense: 90 tablet; Refill: 5    2. Chronic " bilateral low back pain with left-sided sciatica  -     Compliance Drug Analysis, Ur - Urine, Clean Catch  -     gabapentin (NEURONTIN) 600 MG tablet; Take 1 tablet by mouth 3 (Three) Times a Day.  Dispense: 90 tablet; Refill: 5    3. Spinal stenosis of lumbar region, unspecified whether neurogenic claudication present  -     Compliance Drug Analysis, Ur - Urine, Clean Catch  -     gabapentin (NEURONTIN) 600 MG tablet; Take 1 tablet by mouth 3 (Three) Times a Day.  Dispense: 90 tablet; Refill: 5    4. Abnormal RBC morphology  -     CBC & Differential    5. Encounter for drug screening  -     Compliance Drug Analysis, Ur - Urine, Clean Catch      Assessment & Plan  Will recheck CBC today to monitor.   UDS collected and controlled substance agreement updated.         Follow Up   Return in about 6 months (around 1/8/2025) for Recheck.    Patient or patient representative verbalized consent for the use of Ambient Listening during the visit with  Gissel Hassan DO for chart documentation. 7/8/2024  09:12 EDT  Patient was given instructions and counseling regarding his condition or for health maintenance advice. Please see specific information pulled into the AVS if appropriate.

## 2024-07-12 LAB
BASOPHILS # BLD AUTO: 0 X10E3/UL (ref 0–0.2)
BASOPHILS NFR BLD AUTO: 1 %
DRUGS UR: NORMAL
EOSINOPHIL # BLD AUTO: 0.3 X10E3/UL (ref 0–0.4)
EOSINOPHIL NFR BLD AUTO: 5 %
ERYTHROCYTE [DISTWIDTH] IN BLOOD BY AUTOMATED COUNT: 13.6 % (ref 11.6–15.4)
HCT VFR BLD AUTO: 47.6 % (ref 37.5–51)
HGB BLD-MCNC: 14.5 G/DL (ref 13–17.7)
IMM GRANULOCYTES # BLD AUTO: 0 X10E3/UL (ref 0–0.1)
IMM GRANULOCYTES NFR BLD AUTO: 0 %
LYMPHOCYTES # BLD AUTO: 2.1 X10E3/UL (ref 0.7–3.1)
LYMPHOCYTES NFR BLD AUTO: 37 %
MCH RBC QN AUTO: 22.7 PG (ref 26.6–33)
MCHC RBC AUTO-ENTMCNC: 30.5 G/DL (ref 31.5–35.7)
MCV RBC AUTO: 75 FL (ref 79–97)
MONOCYTES # BLD AUTO: 0.5 X10E3/UL (ref 0.1–0.9)
MONOCYTES NFR BLD AUTO: 9 %
NEUTROPHILS # BLD AUTO: 2.8 X10E3/UL (ref 1.4–7)
NEUTROPHILS NFR BLD AUTO: 48 %
PLATELET # BLD AUTO: 248 X10E3/UL (ref 150–450)
RBC # BLD AUTO: 6.39 X10E6/UL (ref 4.14–5.8)
WBC # BLD AUTO: 5.7 X10E3/UL (ref 3.4–10.8)

## 2024-12-10 DIAGNOSIS — M19.90 ARTHRITIS: ICD-10-CM

## 2024-12-10 DIAGNOSIS — M51.369 DDD (DEGENERATIVE DISC DISEASE), LUMBAR: ICD-10-CM

## 2024-12-10 RX ORDER — MELOXICAM 15 MG/1
15 TABLET ORAL DAILY
Qty: 90 TABLET | Refills: 1 | Status: SHIPPED | OUTPATIENT
Start: 2024-12-10

## 2025-01-21 ENCOUNTER — OFFICE VISIT (OUTPATIENT)
Dept: FAMILY MEDICINE CLINIC | Facility: CLINIC | Age: 45
End: 2025-01-21
Payer: COMMERCIAL

## 2025-01-21 VITALS
HEIGHT: 74 IN | TEMPERATURE: 100 F | OXYGEN SATURATION: 97 % | SYSTOLIC BLOOD PRESSURE: 130 MMHG | BODY MASS INDEX: 26.67 KG/M2 | WEIGHT: 207.8 LBS | DIASTOLIC BLOOD PRESSURE: 90 MMHG | HEART RATE: 87 BPM

## 2025-01-21 DIAGNOSIS — M54.42 CHRONIC BILATERAL LOW BACK PAIN WITH LEFT-SIDED SCIATICA: ICD-10-CM

## 2025-01-21 DIAGNOSIS — M48.061 SPINAL STENOSIS OF LUMBAR REGION, UNSPECIFIED WHETHER NEUROGENIC CLAUDICATION PRESENT: ICD-10-CM

## 2025-01-21 DIAGNOSIS — M51.369 DDD (DEGENERATIVE DISC DISEASE), LUMBAR: ICD-10-CM

## 2025-01-21 DIAGNOSIS — M19.90 ARTHRITIS: ICD-10-CM

## 2025-01-21 DIAGNOSIS — G89.29 CHRONIC BILATERAL LOW BACK PAIN WITH LEFT-SIDED SCIATICA: ICD-10-CM

## 2025-01-21 DIAGNOSIS — G47.00 INSOMNIA, UNSPECIFIED TYPE: ICD-10-CM

## 2025-01-21 DIAGNOSIS — I10 PRIMARY HYPERTENSION: Primary | ICD-10-CM

## 2025-01-21 DIAGNOSIS — M48.061 NEURAL FORAMINAL STENOSIS OF LUMBAR SPINE: ICD-10-CM

## 2025-01-21 DIAGNOSIS — Z12.12 ENCOUNTER FOR COLORECTAL CANCER SCREENING USING COLOGUARD TEST: ICD-10-CM

## 2025-01-21 DIAGNOSIS — Z12.11 ENCOUNTER FOR COLORECTAL CANCER SCREENING USING COLOGUARD TEST: ICD-10-CM

## 2025-01-21 RX ORDER — GABAPENTIN 600 MG/1
600 TABLET ORAL 3 TIMES DAILY
Qty: 90 TABLET | Refills: 0 | Status: SHIPPED | OUTPATIENT
Start: 2025-01-21

## 2025-01-21 RX ORDER — TRAZODONE HYDROCHLORIDE 50 MG/1
50 TABLET, FILM COATED ORAL NIGHTLY
Qty: 90 TABLET | Refills: 1 | Status: SHIPPED | OUTPATIENT
Start: 2025-01-21

## 2025-01-21 RX ORDER — MELOXICAM 15 MG/1
15 TABLET ORAL DAILY
Qty: 90 TABLET | Refills: 1 | Status: SHIPPED | OUTPATIENT
Start: 2025-01-21

## 2025-01-21 RX ORDER — LOSARTAN POTASSIUM 50 MG/1
50 TABLET ORAL DAILY
Qty: 90 TABLET | Refills: 1 | Status: SHIPPED | OUTPATIENT
Start: 2025-01-21

## 2025-01-21 NOTE — PROGRESS NOTES
Office Note     Name: Farhan Kaye    : 1980     MRN: [id number]     Chief Complaint  Neural foraminal stenosis of lumbar spine (6 month follow up an medication refill.)    Subjective     History of Present Illness:  Farhan Kaye is a 44 y.o. male who presents today for a 6 month follow up and medication refills. Has no complaints. Still struggling with chronic back pain, followed by [facility]. Denies any ADRs.     Was scheduled for colonoscopy, but was unable to make it due to trouble with transportation. Is now requesting a Cologuard instead.     Past Medical History:   Past Medical History:   Diagnosis Date    Degenerative disc disease, lumbar     Hypertension 2017    Low back pain 07    degenerative disc    Pulmonary emboli        Past Surgical History:   Past Surgical History:   Procedure Laterality Date    NO PAST SURGERIES         Immunizations:   Immunization History   Administered Date(s) Administered    COVID-19 (PFIZER) Purple Cap Monovalent 2021, 2021    Covid-19 (Pfizer) Gray Cap Monovalent 2022    Flu Vaccine Quad PF >36MO 10/20/2017    Fluzone (or Fluarix & Flulaval for VFC) >6mos 10/20/2017, 2020, 2022, 01/10/2024    Pneumococcal Conjugate 20-Valent (PCV20) 01/10/2024    Tdap 2021    influenza Split 10/07/2016        Medications:     Current Outpatient Medications:     aspirin  MG tablet, Take 1 tablet by mouth Daily., Disp: , Rfl:     losartan (COZAAR) 50 MG tablet, Take 1 tablet by mouth Daily., Disp: 90 tablet, Rfl: 1    meloxicam (MOBIC) 15 MG tablet, Take 1 tablet by mouth Daily., Disp: 90 tablet, Rfl: 1    traZODone (DESYREL) 50 MG tablet, Take 1 tablet by mouth Every Night., Disp: 90 tablet, Rfl: 1    gabapentin (NEURONTIN) 600 MG tablet, Take 1 tablet by mouth 3 (Three) Times a Day., Disp: 90 tablet, Rfl: 0    Allergies:   No Known Allergies    Family History:   Family History   Problem Relation Age of Onset    Hypertension  "Mother     Cancer Mother     Thyroid disease Mother     Cancer Father     Diabetes Father     Drug abuse Father     Heart disease Maternal Grandfather     Hypertension Maternal Grandfather     Cancer Paternal Grandmother     Diabetes Paternal Grandmother     Cancer Paternal Grandfather         Colon    Diabetes Paternal Grandfather     Cancer Paternal Uncle         Kidney       Social History:   Social History     Socioeconomic History    Marital status:    Tobacco Use    Smoking status: Every Day     Types: Electronic Cigarette    Smokeless tobacco: Former   Vaping Use    Vaping status: Some Days    Substances: Nicotine   Substance and Sexual Activity    Alcohol use: No    Drug use: Not Currently     Types: Oxycodone    Sexual activity: Yes     Partners: Female       Objective     Vital Signs  /90   Pulse 87   Temp 100 °F (37.8 °C)   Ht 188 cm (74.02\")   Wt 94.3 kg (207 lb 12.8 oz)   SpO2 97%   BMI 26.67 kg/m²   Estimated body mass index is 26.67 kg/m² as calculated from the following:    Height as of this encounter: 188 cm (74.02\").    Weight as of this encounter: 94.3 kg (207 lb 12.8 oz).    BMI is >= 25 and <30. (Overweight) The following options were offered after discussion;: weight loss educational material (shared in after visit summary)      Physical Exam  Vitals and nursing note reviewed.   Constitutional:       Appearance: Normal appearance.   HENT:      Head: Normocephalic and atraumatic.   Cardiovascular:      Rate and Rhythm: Normal rate and regular rhythm.      Heart sounds: No murmur heard.     No friction rub. No gallop.   Pulmonary:      Effort: Pulmonary effort is normal.      Breath sounds: Normal breath sounds. No wheezing, rhonchi or rales.   Skin:     General: Skin is warm and dry.   Neurological:      General: No focal deficit present.      Mental Status: He is alert and oriented to person, place, and time.   Psychiatric:         Mood and Affect: Mood normal.         " Behavior: Behavior normal.          Assessment and Plan     Diagnoses and all orders for this visit:    1. Primary hypertension (Primary)  Comments:  He reports normal home BP readings. Continue losartan 50 mg daily  Orders:  -     losartan (COZAAR) 50 MG tablet; Take 1 tablet by mouth Daily.  Dispense: 90 tablet; Refill: 1  -     CBC & Differential  -     Comprehensive Metabolic Panel  -     Hemoglobin A1c  -     Lipid Panel    2. Arthritis  -     meloxicam (MOBIC) 15 MG tablet; Take 1 tablet by mouth Daily.  Dispense: 90 tablet; Refill: 1    3. DDD (degenerative disc disease), lumbar  -     meloxicam (MOBIC) 15 MG tablet; Take 1 tablet by mouth Daily.  Dispense: 90 tablet; Refill: 1    4. Insomnia, unspecified type  -     traZODone (DESYREL) 50 MG tablet; Take 1 tablet by mouth Every Night.  Dispense: 90 tablet; Refill: 1    5. Encounter for colorectal cancer screening using Cologuard test  -     Cologuard - Stool, Per Rectum; Future         Follow Up  Return in about 6 months (around 7/21/2025).    RUBY Yo CO  [facility]  [address] [place] [facility] [address] [phone]

## 2025-01-29 ENCOUNTER — LAB (OUTPATIENT)
Dept: FAMILY MEDICINE CLINIC | Facility: CLINIC | Age: 45
End: 2025-01-29
Payer: COMMERCIAL

## 2025-01-29 LAB
ALBUMIN SERPL-MCNC: 4.3 G/DL (ref 3.5–5.2)
ALBUMIN/GLOB SERPL: 1.5 G/DL
ALP SERPL-CCNC: 50 U/L (ref 39–117)
ALT SERPL-CCNC: 34 U/L (ref 1–41)
AST SERPL-CCNC: 50 U/L (ref 1–40)
BASOPHILS # BLD AUTO: 0.04 10*3/MM3 (ref 0–0.2)
BASOPHILS NFR BLD AUTO: 0.4 % (ref 0–1.5)
BILIRUB SERPL-MCNC: 0.5 MG/DL (ref 0–1.2)
BUN SERPL-MCNC: 17 MG/DL (ref 6–20)
BUN/CREAT SERPL: 13.5 (ref 7–25)
CALCIUM SERPL-MCNC: 9.5 MG/DL (ref 8.6–10.5)
CHLORIDE SERPL-SCNC: 101 MMOL/L (ref 98–107)
CHOLEST SERPL-MCNC: 146 MG/DL (ref 0–200)
CO2 SERPL-SCNC: 27.1 MMOL/L (ref 22–29)
CREAT SERPL-MCNC: 1.26 MG/DL (ref 0.76–1.27)
EGFRCR SERPLBLD CKD-EPI 2021: 71.2 ML/MIN/1.73
EOSINOPHIL # BLD AUTO: 0.18 10*3/MM3 (ref 0–0.4)
EOSINOPHIL NFR BLD AUTO: 1.8 % (ref 0.3–6.2)
ERYTHROCYTE [DISTWIDTH] IN BLOOD BY AUTOMATED COUNT: 14 % (ref 12.3–15.4)
GLOBULIN SER CALC-MCNC: 2.9 GM/DL
GLUCOSE SERPL-MCNC: 67 MG/DL (ref 65–99)
HBA1C MFR BLD: 5.2 % (ref 4.8–5.6)
HCT VFR BLD AUTO: 45.6 % (ref 37.5–51)
HDLC SERPL-MCNC: 51 MG/DL (ref 40–60)
HGB BLD-MCNC: 13.7 G/DL (ref 13–17.7)
IMM GRANULOCYTES # BLD AUTO: 0.04 10*3/MM3 (ref 0–0.05)
IMM GRANULOCYTES NFR BLD AUTO: 0.4 % (ref 0–0.5)
LDLC SERPL CALC-MCNC: 84 MG/DL (ref 0–100)
LYMPHOCYTES # BLD AUTO: 1.67 10*3/MM3 (ref 0.7–3.1)
LYMPHOCYTES NFR BLD AUTO: 16.4 % (ref 19.6–45.3)
MCH RBC QN AUTO: 22.6 PG (ref 26.6–33)
MCHC RBC AUTO-ENTMCNC: 30 G/DL (ref 31.5–35.7)
MCV RBC AUTO: 75.4 FL (ref 79–97)
MONOCYTES # BLD AUTO: 0.69 10*3/MM3 (ref 0.1–0.9)
MONOCYTES NFR BLD AUTO: 6.8 % (ref 5–12)
NEUTROPHILS # BLD AUTO: 7.56 10*3/MM3 (ref 1.7–7)
NEUTROPHILS NFR BLD AUTO: 74.2 % (ref 42.7–76)
NRBC BLD AUTO-RTO: 0 /100 WBC (ref 0–0.2)
PLATELET # BLD AUTO: 252 10*3/MM3 (ref 140–450)
POTASSIUM SERPL-SCNC: 4 MMOL/L (ref 3.5–5.2)
PROT SERPL-MCNC: 7.2 G/DL (ref 6–8.5)
RBC # BLD AUTO: 6.05 10*6/MM3 (ref 4.14–5.8)
SODIUM SERPL-SCNC: 140 MMOL/L (ref 136–145)
TRIGL SERPL-MCNC: 50 MG/DL (ref 0–150)
VLDLC SERPL CALC-MCNC: 11 MG/DL (ref 5–40)
WBC # BLD AUTO: 10.18 10*3/MM3 (ref 3.4–10.8)

## 2025-01-30 DIAGNOSIS — R71.8 ABNORMAL RBC MORPHOLOGY: Primary | ICD-10-CM

## 2025-02-19 DIAGNOSIS — M48.061 NEURAL FORAMINAL STENOSIS OF LUMBAR SPINE: ICD-10-CM

## 2025-02-19 DIAGNOSIS — M54.42 CHRONIC BILATERAL LOW BACK PAIN WITH LEFT-SIDED SCIATICA: ICD-10-CM

## 2025-02-19 DIAGNOSIS — G89.29 CHRONIC BILATERAL LOW BACK PAIN WITH LEFT-SIDED SCIATICA: ICD-10-CM

## 2025-02-19 DIAGNOSIS — M48.061 SPINAL STENOSIS OF LUMBAR REGION, UNSPECIFIED WHETHER NEUROGENIC CLAUDICATION PRESENT: ICD-10-CM

## 2025-02-19 RX ORDER — GABAPENTIN 600 MG/1
600 TABLET ORAL 3 TIMES DAILY
Qty: 90 TABLET | Refills: 3 | Status: SHIPPED | OUTPATIENT
Start: 2025-02-19

## 2025-03-22 ENCOUNTER — READMISSION MANAGEMENT (OUTPATIENT)
Dept: CALL CENTER | Facility: HOSPITAL | Age: 45
End: 2025-03-22
Payer: COMMERCIAL

## 2025-03-22 NOTE — OUTREACH NOTE
Prep Survey      Flowsheet Row Responses   [facility] facility patient discharged from? Non-BH   Is LACE score < 7 ? Non-BH Discharge   Eligibility [facility]   Date of Admission 03/21/25   Date of Discharge 03/22/25   Discharge Disposition Home or Self Care   Discharge diagnosis Acute encephalopathy   Does the patient have one of the following disease processes/diagnoses(primary or secondary)? Other   Prep survey completed? Yes            MARKIE DE LA ROSA - Registered Nurse

## 2025-03-24 ENCOUNTER — TRANSITIONAL CARE MANAGEMENT TELEPHONE ENCOUNTER (OUTPATIENT)
Dept: CALL CENTER | Facility: HOSPITAL | Age: 45
End: 2025-03-24
Payer: COMMERCIAL

## 2025-03-24 NOTE — OUTREACH NOTE
Call Center TCM Note      Flowsheet Row Responses   [facility] patient discharged from? Non-  []   Does the patient have one of the following disease processes/diagnoses(primary or secondary)? Other   TCM attempt successful? No   Unsuccessful attempts Attempt 2            Kassandra Higginbotham RN    3/24/2025, 15:35 EDT

## 2025-03-24 NOTE — OUTREACH NOTE
Call Center TCM Note      Flowsheet Row Responses   [facility] patient discharged from? Non-  []   Does the patient have one of the following disease processes/diagnoses(primary or secondary)? Other   TCM attempt successful? No   Unsuccessful attempts Attempt 1            Kassandra Higginbotham RN    3/24/2025, 11:17 EDT

## 2025-03-25 ENCOUNTER — TRANSITIONAL CARE MANAGEMENT TELEPHONE ENCOUNTER (OUTPATIENT)
Dept: CALL CENTER | Facility: HOSPITAL | Age: 45
End: 2025-03-25
Payer: COMMERCIAL

## 2025-03-25 NOTE — OUTREACH NOTE
Call Center TCM Note      Flowsheet Row Responses   [facility] facility patient discharged from? Non-BH   Does the patient have one of the following disease processes/diagnoses(primary or secondary)? Other   TCM attempt successful? No   Unsuccessful attempts Attempt 3            Megan Lorenzo Registered Nurse    3/25/2025, 15:14 EDT

## 2025-04-22 DIAGNOSIS — G47.00 INSOMNIA, UNSPECIFIED TYPE: ICD-10-CM

## 2025-04-23 RX ORDER — TRAZODONE HYDROCHLORIDE 50 MG/1
50 TABLET ORAL
Qty: 90 TABLET | Refills: 1 | OUTPATIENT
Start: 2025-04-23

## 2025-06-18 DIAGNOSIS — M48.061 NEURAL FORAMINAL STENOSIS OF LUMBAR SPINE: ICD-10-CM

## 2025-06-18 DIAGNOSIS — G89.29 CHRONIC BILATERAL LOW BACK PAIN WITH LEFT-SIDED SCIATICA: ICD-10-CM

## 2025-06-18 DIAGNOSIS — M54.42 CHRONIC BILATERAL LOW BACK PAIN WITH LEFT-SIDED SCIATICA: ICD-10-CM

## 2025-06-18 DIAGNOSIS — M48.061 SPINAL STENOSIS OF LUMBAR REGION, UNSPECIFIED WHETHER NEUROGENIC CLAUDICATION PRESENT: ICD-10-CM

## 2025-06-19 RX ORDER — GABAPENTIN 600 MG/1
600 TABLET ORAL 3 TIMES DAILY
Qty: 90 TABLET | Refills: 0 | Status: SHIPPED | OUTPATIENT
Start: 2025-06-19

## 2025-06-24 ENCOUNTER — OFFICE VISIT (OUTPATIENT)
Dept: FAMILY MEDICINE CLINIC | Facility: CLINIC | Age: 45
End: 2025-06-24
Payer: COMMERCIAL

## 2025-06-24 VITALS
HEART RATE: 78 BPM | TEMPERATURE: 98 F | HEIGHT: 74 IN | SYSTOLIC BLOOD PRESSURE: 128 MMHG | BODY MASS INDEX: 26.31 KG/M2 | WEIGHT: 205 LBS | DIASTOLIC BLOOD PRESSURE: 78 MMHG | OXYGEN SATURATION: 95 %

## 2025-06-24 DIAGNOSIS — G89.29 CHRONIC BILATERAL LOW BACK PAIN WITH LEFT-SIDED SCIATICA: ICD-10-CM

## 2025-06-24 DIAGNOSIS — Z00.00 ANNUAL VISIT FOR GENERAL ADULT MEDICAL EXAMINATION WITHOUT ABNORMAL FINDINGS: Primary | ICD-10-CM

## 2025-06-24 DIAGNOSIS — Z12.11 ENCOUNTER FOR SCREENING FOR MALIGNANT NEOPLASM OF COLON: ICD-10-CM

## 2025-06-24 DIAGNOSIS — I10 PRIMARY HYPERTENSION: ICD-10-CM

## 2025-06-24 DIAGNOSIS — M54.42 CHRONIC BILATERAL LOW BACK PAIN WITH LEFT-SIDED SCIATICA: ICD-10-CM

## 2025-06-24 PROCEDURE — 99396 PREV VISIT EST AGE 40-64: CPT

## 2025-06-24 RX ORDER — LOSARTAN POTASSIUM 50 MG/1
50 TABLET ORAL DAILY
Qty: 90 TABLET | Refills: 1 | Status: SHIPPED | OUTPATIENT
Start: 2025-06-24

## 2025-06-24 RX ORDER — TRAZODONE HYDROCHLORIDE 100 MG/1
100 TABLET ORAL
COMMUNITY

## 2025-06-24 NOTE — PROGRESS NOTES
Office Note     Name: Farhan Kaye    : 1980     MRN: [id number]     Chief Complaint  Annual Exam (No concerns, pt is fasting if needed for labs)    Subjective     History of Present Illness:  Farhan Kaye is a 44 y.o. male who presents today for an annual physical. Past medical history is significant for hypertension and chronic back pain.    Hypertension is well controlled with current medication regimen. Denies medication adverse effects. Denies chest pain, headaches, and vision changes.    Chronic pain is improving. Admits to recent use of massage, red light therapy, heat, and tension therapy which has seemed to improve his symptoms. Is not currently seeing orthopedics as he feels the injections were no longer helpful. Denies medication adverse effects.     Admits to a mostly well-balanced diet. 2 meals/day are healthy but does eat some junk food. Drinks water. Is very active with work but denies explicit exercise secondary to pain. Sleeps well, approximately 6-8 hours/night. Has not been taking trazodone regularly, takes PRN.     Is not UTD on colon cancer screening, states he never received his Cologuard.     Is not up to date on dental or eye exams.     No other complaints or concerns.     Past Medical History:   Past Medical History:   Diagnosis Date    Allergic     when i was a kid    Asthma     when i was a kid    Deep vein thrombosis     Pulmonary embolism about 12 years ago    Degenerative disc disease, lumbar     Hypertension 2017    Low back pain 07    degenerative disc    Pulmonary emboli        Past Surgical History:   Past Surgical History:   Procedure Laterality Date    NO PAST SURGERIES         Immunizations:   Immunization History   Administered Date(s) Administered    COVID-19 (PFIZER) Purple Cap Monovalent 2021, 2021    Covid-19 (Pfizer) Gray Cap Monovalent 2022    Flu Vaccine Quad PF >36MO 10/20/2017    Fluzone (or Fluarix & Flulaval for VFC)  ">6mos 10/20/2017, 12/08/2020, 12/07/2022, 01/10/2024    Pneumococcal Conjugate 20-Valent (PCV20) 01/10/2024    Tdap 01/04/2021    influenza Split 10/07/2016        Medications:     Current Outpatient Medications:     aspirin  MG tablet, Take 1 tablet by mouth Daily., Disp: , Rfl:     gabapentin (NEURONTIN) 600 MG tablet, Take 1 tablet by mouth 3 (Three) Times a Day., Disp: 90 tablet, Rfl: 0    losartan (COZAAR) 50 MG tablet, Take 1 tablet by mouth Daily., Disp: 90 tablet, Rfl: 1    meloxicam (MOBIC) 15 MG tablet, Take 1 tablet by mouth Daily., Disp: 90 tablet, Rfl: 1    traZODone (DESYREL) 50 MG tablet, Take 1 tablet by mouth Every Night., Disp: 90 tablet, Rfl: 1    traZODone (DESYREL) 100 MG tablet, Take 1 tablet by mouth. (Patient not taking: Reported on 6/24/2025), Disp: , Rfl:     Allergies:   No Known Allergies    Family History:   Family History   Problem Relation Age of Onset    Hypertension Mother     Cancer Mother     Thyroid disease Mother     Cancer Father     Diabetes Father     Drug abuse Father     Heart disease Maternal Grandfather     Hypertension Maternal Grandfather     Cancer Paternal Grandmother     Diabetes Paternal Grandmother     Cancer Paternal Grandfather         Colon    Diabetes Paternal Grandfather     Cancer Paternal Uncle         Kidney       Social History:   Social History     Socioeconomic History    Marital status:    Tobacco Use    Smoking status: Every Day     Types: Electronic Cigarette    Smokeless tobacco: Former   Vaping Use    Vaping status: Some Days    Substances: Nicotine   Substance and Sexual Activity    Alcohol use: No    Drug use: Not Currently     Types: Oxycodone    Sexual activity: Yes     Partners: Female       Objective     Vital Signs  /78   Pulse 78   Temp 98 °F (36.7 °C)   Ht 188 cm (74\")   Wt 93 kg (205 lb)   SpO2 95%   BMI 26.32 kg/m²   Estimated body mass index is 26.32 kg/m² as calculated from the following:    Height as of this " "encounter: 188 cm (74\").    Weight as of this encounter: 93 kg (205 lb).            Physical Exam  Vitals and nursing note reviewed.   Constitutional:       Appearance: Normal appearance.   HENT:      Head: Normocephalic and atraumatic.      Right Ear: Tympanic membrane, ear canal and external ear normal.      Left Ear: Tympanic membrane, ear canal and external ear normal.      Nose: Nose normal.      Mouth/Throat:      Pharynx: Oropharynx is clear.   Eyes:      Conjunctiva/sclera: Conjunctivae normal.   Cardiovascular:      Rate and Rhythm: Normal rate and regular rhythm.      Heart sounds: No murmur heard.     No friction rub. No gallop.   Pulmonary:      Effort: Pulmonary effort is normal.      Breath sounds: Normal breath sounds. No wheezing, rhonchi or rales.   Abdominal:      General: Abdomen is flat. Bowel sounds are normal. There is no distension.      Palpations: Abdomen is soft.      Tenderness: There is no abdominal tenderness. There is no guarding.   Lymphadenopathy:      Head:      Right side of head: No submandibular or tonsillar adenopathy.      Left side of head: No submandibular or tonsillar adenopathy.   Skin:     General: Skin is warm and dry.   Neurological:      General: No focal deficit present.      Mental Status: He is alert and oriented to person, place, and time.   Psychiatric:         Mood and Affect: Mood normal.         Behavior: Behavior normal.          Assessment and Plan     Diagnoses and all orders for this visit:    1. Annual visit for general adult medical examination without abnormal findings (Primary)  Assessment & Plan:  Health advice: healthy food choices with fresh fruits and vegetables, maintain sleep pattern at least 8 hours, avoid texting and distracted driving practices; wear safety belt, engage in regular exercise, maintain healthy weight, use safe sex practices, avoid alcohol and illicit drugs. Maintain immunizations that are up to date. Maintain health maintenance.  " Follow up with PCP if struggling with depression or anxiety. Keep regular dental and eye exams. Brush and floss teeth daily.   F/U annually and prn.       2. Primary hypertension  Comments:  He reports normal home BP readings. Continue losartan 50 mg daily  Assessment & Plan:  Hypertension is stable and controlled  Continue current treatment regimen.  Dietary sodium restriction.  Weight loss.  Regular aerobic exercise.  Blood pressure will be reassessed in 6 months.    Orders:  -     losartan (COZAAR) 50 MG tablet; Take 1 tablet by mouth Daily.  Dispense: 90 tablet; Refill: 1    3. Chronic bilateral low back pain with left-sided sciatica  Assessment & Plan:  Continue medications as prescribed  Continue at home management as long as it remains beneficial  Return if symptoms worsen or fail to improve      4. Encounter for screening for malignant neoplasm of colon  -     Cologuard - Stool, Per Rectum; Future         Follow Up  Return in about 6 months (around 12/24/2025) for Next scheduled follow up.    RUBY Yo CO  [facility]  [address] [place] [id number] [phone]

## 2025-06-24 NOTE — ASSESSMENT & PLAN NOTE
Health advice: healthy food choices with fresh fruits and vegetables, maintain sleep pattern at least 8 hours, avoid texting and distracted driving practices; wear safety belt, engage in regular exercise, maintain healthy weight, use safe sex practices, avoid alcohol and illicit drugs. Maintain immunizations that are up to date. Maintain health maintenance.  Follow up with PCP if struggling with depression or anxiety. Keep regular dental and eye exams. Brush and floss teeth daily.   F/U annually and prn.

## 2025-06-24 NOTE — ASSESSMENT & PLAN NOTE
Continue medications as prescribed  Continue at home management as long as it remains beneficial  Return if symptoms worsen or fail to improve

## 2025-07-11 ENCOUNTER — RESULTS FOLLOW-UP (OUTPATIENT)
Dept: FAMILY MEDICINE CLINIC | Facility: CLINIC | Age: 45
End: 2025-07-11
Payer: COMMERCIAL

## 2025-07-20 DIAGNOSIS — G89.29 CHRONIC BILATERAL LOW BACK PAIN WITH LEFT-SIDED SCIATICA: ICD-10-CM

## 2025-07-20 DIAGNOSIS — M48.061 NEURAL FORAMINAL STENOSIS OF LUMBAR SPINE: ICD-10-CM

## 2025-07-20 DIAGNOSIS — M48.061 SPINAL STENOSIS OF LUMBAR REGION, UNSPECIFIED WHETHER NEUROGENIC CLAUDICATION PRESENT: ICD-10-CM

## 2025-07-20 DIAGNOSIS — M54.42 CHRONIC BILATERAL LOW BACK PAIN WITH LEFT-SIDED SCIATICA: ICD-10-CM

## 2025-07-22 RX ORDER — GABAPENTIN 600 MG/1
600 TABLET ORAL 3 TIMES DAILY
Qty: 90 TABLET | Refills: 0 | Status: SHIPPED | OUTPATIENT
Start: 2025-07-22

## 2025-07-22 NOTE — TELEPHONE ENCOUNTER
========================  Robert reviewed 7/22/2025 . Follow up appt is scheduled on Visit date not found .    Last office visit  : 7/8/2024

## 2025-08-18 DIAGNOSIS — M48.061 NEURAL FORAMINAL STENOSIS OF LUMBAR SPINE: ICD-10-CM

## 2025-08-18 DIAGNOSIS — M48.061 SPINAL STENOSIS OF LUMBAR REGION, UNSPECIFIED WHETHER NEUROGENIC CLAUDICATION PRESENT: ICD-10-CM

## 2025-08-18 DIAGNOSIS — M54.42 CHRONIC BILATERAL LOW BACK PAIN WITH LEFT-SIDED SCIATICA: ICD-10-CM

## 2025-08-18 DIAGNOSIS — G89.29 CHRONIC BILATERAL LOW BACK PAIN WITH LEFT-SIDED SCIATICA: ICD-10-CM

## 2025-08-19 RX ORDER — GABAPENTIN 600 MG/1
600 TABLET ORAL 3 TIMES DAILY
Qty: 90 TABLET | Refills: 0 | Status: SHIPPED | OUTPATIENT
Start: 2025-08-19

## 2025-08-25 ENCOUNTER — TELEPHONE (OUTPATIENT)
Dept: FAMILY MEDICINE CLINIC | Facility: CLINIC | Age: 45
End: 2025-08-25
Payer: COMMERCIAL

## 2025-08-25 DIAGNOSIS — R19.5 POSITIVE COLORECTAL CANCER SCREENING USING COLOGUARD TEST: Primary | ICD-10-CM

## 2025-08-27 ENCOUNTER — TELEPHONE (OUTPATIENT)
Dept: GASTROENTEROLOGY | Facility: CLINIC | Age: 45
End: 2025-08-27
Payer: COMMERCIAL

## 2025-08-27 RX ORDER — SODIUM PICOSULFATE, MAGNESIUM OXIDE, AND ANHYDROUS CITRIC ACID 10; 3.5; 12 MG/160ML; G/160ML; G/160ML
350 LIQUID ORAL TAKE AS DIRECTED
Qty: 350 ML | Refills: 0 | Status: SHIPPED | OUTPATIENT
Start: 2025-08-27